# Patient Record
Sex: FEMALE | Race: WHITE | NOT HISPANIC OR LATINO | Employment: PART TIME | URBAN - METROPOLITAN AREA
[De-identification: names, ages, dates, MRNs, and addresses within clinical notes are randomized per-mention and may not be internally consistent; named-entity substitution may affect disease eponyms.]

---

## 2017-01-12 ENCOUNTER — APPOINTMENT (OUTPATIENT)
Dept: PHYSICAL THERAPY | Facility: REHABILITATION | Age: 52
End: 2017-01-12
Payer: COMMERCIAL

## 2017-01-17 ENCOUNTER — ALLSCRIPTS OFFICE VISIT (OUTPATIENT)
Dept: OTHER | Facility: OTHER | Age: 52
End: 2017-01-17

## 2017-01-23 ENCOUNTER — APPOINTMENT (OUTPATIENT)
Dept: PHYSICAL THERAPY | Facility: REHABILITATION | Age: 52
End: 2017-01-23
Payer: COMMERCIAL

## 2017-01-23 PROCEDURE — 97164 PT RE-EVAL EST PLAN CARE: CPT

## 2017-01-23 PROCEDURE — 97110 THERAPEUTIC EXERCISES: CPT

## 2017-01-24 ENCOUNTER — GENERIC CONVERSION - ENCOUNTER (OUTPATIENT)
Dept: OTHER | Facility: OTHER | Age: 52
End: 2017-01-24

## 2017-03-30 ENCOUNTER — GENERIC CONVERSION - ENCOUNTER (OUTPATIENT)
Dept: OTHER | Facility: OTHER | Age: 52
End: 2017-03-30

## 2017-09-01 ENCOUNTER — ALLSCRIPTS OFFICE VISIT (OUTPATIENT)
Dept: OTHER | Facility: OTHER | Age: 52
End: 2017-09-01

## 2017-09-01 DIAGNOSIS — E04.1 NONTOXIC SINGLE THYROID NODULE: ICD-10-CM

## 2017-09-06 ENCOUNTER — LAB CONVERSION - ENCOUNTER (OUTPATIENT)
Dept: OTHER | Facility: OTHER | Age: 52
End: 2017-09-06

## 2017-09-06 ENCOUNTER — GENERIC CONVERSION - ENCOUNTER (OUTPATIENT)
Dept: OTHER | Facility: OTHER | Age: 52
End: 2017-09-06

## 2017-09-06 LAB — TSH SERPL DL<=0.05 MIU/L-ACNC: 3.5 MIU/L

## 2017-09-07 DIAGNOSIS — Z12.31 ENCOUNTER FOR SCREENING MAMMOGRAM FOR MALIGNANT NEOPLASM OF BREAST: ICD-10-CM

## 2017-09-08 ENCOUNTER — HOSPITAL ENCOUNTER (OUTPATIENT)
Dept: RADIOLOGY | Facility: HOSPITAL | Age: 52
Discharge: HOME | End: 2017-09-08
Payer: COMMERCIAL

## 2017-09-08 DIAGNOSIS — E04.1 NONTOXIC SINGLE THYROID NODULE: ICD-10-CM

## 2017-09-08 PROCEDURE — 76536 US EXAM OF HEAD AND NECK: CPT

## 2017-09-11 ENCOUNTER — GENERIC CONVERSION - ENCOUNTER (OUTPATIENT)
Dept: OTHER | Facility: OTHER | Age: 52
End: 2017-09-11

## 2017-11-06 ENCOUNTER — APPOINTMENT (OUTPATIENT)
Dept: PHYSICAL THERAPY | Facility: REHABILITATION | Age: 52
End: 2017-11-06
Payer: COMMERCIAL

## 2017-11-06 PROCEDURE — 97140 MANUAL THERAPY 1/> REGIONS: CPT

## 2017-11-06 PROCEDURE — 97161 PT EVAL LOW COMPLEX 20 MIN: CPT

## 2017-11-06 PROCEDURE — G8978 MOBILITY CURRENT STATUS: HCPCS

## 2017-11-06 PROCEDURE — G8979 MOBILITY GOAL STATUS: HCPCS

## 2017-11-10 ENCOUNTER — APPOINTMENT (OUTPATIENT)
Dept: PHYSICAL THERAPY | Facility: REHABILITATION | Age: 52
End: 2017-11-10
Payer: COMMERCIAL

## 2017-11-10 PROCEDURE — 97110 THERAPEUTIC EXERCISES: CPT

## 2017-11-10 PROCEDURE — 97140 MANUAL THERAPY 1/> REGIONS: CPT

## 2017-11-13 ENCOUNTER — APPOINTMENT (OUTPATIENT)
Dept: PHYSICAL THERAPY | Facility: REHABILITATION | Age: 52
End: 2017-11-13
Payer: COMMERCIAL

## 2017-11-13 PROCEDURE — 97110 THERAPEUTIC EXERCISES: CPT

## 2017-11-13 PROCEDURE — 97140 MANUAL THERAPY 1/> REGIONS: CPT

## 2017-11-16 ENCOUNTER — APPOINTMENT (OUTPATIENT)
Dept: PHYSICAL THERAPY | Facility: REHABILITATION | Age: 52
End: 2017-11-16
Payer: COMMERCIAL

## 2017-11-16 PROCEDURE — 97140 MANUAL THERAPY 1/> REGIONS: CPT

## 2017-11-16 PROCEDURE — 97110 THERAPEUTIC EXERCISES: CPT

## 2017-11-20 ENCOUNTER — APPOINTMENT (OUTPATIENT)
Dept: PHYSICAL THERAPY | Facility: REHABILITATION | Age: 52
End: 2017-11-20
Payer: COMMERCIAL

## 2017-11-20 PROCEDURE — 97140 MANUAL THERAPY 1/> REGIONS: CPT

## 2017-11-20 PROCEDURE — 97110 THERAPEUTIC EXERCISES: CPT

## 2017-11-22 ENCOUNTER — APPOINTMENT (OUTPATIENT)
Dept: PHYSICAL THERAPY | Facility: REHABILITATION | Age: 52
End: 2017-11-22
Payer: COMMERCIAL

## 2017-11-22 PROCEDURE — 97140 MANUAL THERAPY 1/> REGIONS: CPT

## 2017-11-22 PROCEDURE — 97110 THERAPEUTIC EXERCISES: CPT

## 2017-11-28 ENCOUNTER — APPOINTMENT (OUTPATIENT)
Dept: PHYSICAL THERAPY | Facility: REHABILITATION | Age: 52
End: 2017-11-28
Payer: COMMERCIAL

## 2018-01-10 NOTE — RESULT NOTES
Discussion/Summary   THYROID BLOOD TEST IS GOOD   [facility]     Verified Results  (Q) TSH, 3RD GENERATION W/REFLEX TO FT4 [id number] 09:04AM Kathy Book   REPORT COMMENT:  FASTING:YES     Test Name Result Flag Reference   TSH W/REFLEX TO FT4 3 50 mIU/L     Reference Range                         > or = 20 Years  0 40-4 50                              Pregnancy Ranges            First trimester    0 26-2 66            Second trimester   0 55-2 73            Third trimester    0 43-2 91

## 2018-01-12 NOTE — RESULT NOTES
Verified Results  US THYROID [id number] 09:32AM Myles Shoemaker Order Number: [id number]    - Patient Instructions: To schedule this appointment, please contact Central Scheduling at [phone]  Test Name Result Flag Reference   US THYROID (Report)     THYROID ULTRASOUND     INDICATION: Follow-up thyroid nodule  COMPARISON: 4/13/2016     TECHNIQUE:  Ultrasound of the thyroid was performed with a high frequency linear transducer in transverse and sagittal planes including volumetric imaging sweeps as well as traditional still imaging technique  FINDINGS:   Normal homogeneous smooth echotexture  Right gland: 4 6 x 1 6 x 1 2 cm  No dominant nodules  Left gland: 5 1 x 1 8 x 2 1 cm  Allowing for differences in measurement technique, there is no substantial change in the isoechoic nodule in the mid to lower pole, measuring 2 8 x 1 5 x 1 9 cm-previously 2 6 x 2 1 x 1 6 cm  Isthmus: The isthmus is 0 1 cm in AP dimension  IMPRESSION:      Stable left thyroid nodule               Workstation performed: [id number]     Signed by:   Felipe De Los Santos MD   9/11/17

## 2018-01-13 VITALS
WEIGHT: 161.25 LBS | SYSTOLIC BLOOD PRESSURE: 110 MMHG | DIASTOLIC BLOOD PRESSURE: 84 MMHG | HEIGHT: 55 IN | HEART RATE: 60 BPM | BODY MASS INDEX: 37.32 KG/M2 | RESPIRATION RATE: 16 BRPM

## 2018-01-13 NOTE — RESULT NOTES
Verified Results  (Q) COMPREHENSIVE METABOLIC PNL W/ADJUSTED CALCIUM 28Apr2016 09:04AM Lilian Alvarez     Test Name Result Flag Reference   GLUCOSE 65 mg/dL  65-99   Fasting reference interval   UREA NITROGEN (BUN) 17 mg/dL  7-25   CREATININE 0 73 mg/dL  0 50-1 05   For patients >52years of age, the reference limit  for Creatinine is approximately 13% higher for people  identified as -American  eGFR NON-AFR  AMERICAN 96 mL/min/1 73m2  > OR = 60   eGFR AFRICAN AMERICAN 111 mL/min/1 73m2  > OR = 60   BUN/CREATININE RATIO   0-32   NOT APPLICABLE (calc)   SODIUM 137 mmol/L  135-146   POTASSIUM 4 2 mmol/L  3 5-5 3   CHLORIDE 100 mmol/L     CARBON DIOXIDE 24 mmol/L  19-30   CALCIUM 9 2 mg/dL  8 6-10 4   CALCIUM (ADJUSTED FOR$ALBUMIN) 9 3 mg/dL (calc)  8 6-10 2   PROTEIN, TOTAL 6 6 g/dL  6 1-8 1   ALBUMIN 4 2 g/dL  3 6-5 1   GLOBULIN 2 4 g/dL (calc)  1 9-3 7   ALBUMIN/GLOBULIN RATIO 1 8 (calc)  1 0-2 5   BILIRUBIN, TOTAL 1 0 mg/dL  0 2-1 2   ALKALINE PHOSPHATASE 25 U/L L    AST 16 U/L  10-35   ALT 13 U/L  6-29     (Q) LIPID PANEL WITH REFLEX TO DIRECT LDL 28Apr2016 09:04AM Lilian Alvarez     Test Name Result Flag Reference   CHOLESTEROL, TOTAL 185 mg/dL  125-200   HDL CHOLESTEROL 48 mg/dL  > OR = 46   TRIGLICERIDES 192 mg/dL  <150   LDL-CHOLESTEROL 116 mg/dL (calc)  <130   Desirable range <100 mg/dL for patients with CHD or  diabetes and <70 mg/dL for diabetic patients with  known heart disease  CHOL/HDLC RATIO 3 9 (calc)  < OR = 5 0   NON HDL CHOLESTEROL 137 mg/dL (calc)     Target for non-HDL cholesterol is 30 mg/dL higher than   LDL cholesterol target       (Q) TSH, 3RD GENERATION W/REFLEX TO FT4 28Apr2016 09:04AM Lilian Alvarez   REPORT COMMENT:  FASTING:YES     Test Name Result Flag Reference   TSH W/REFLEX TO FT4 2 00 mIU/L     Reference Range                         > or = 20 Years  0 40-4 50                              Pregnancy Ranges            First trimester    0 26-2 66 Second trimester   0 55-2 73            Third trimester    0 43-2 91       Discussion/Summary   VERY GOOD     Annette Garza

## 2018-01-15 VITALS
BODY MASS INDEX: 26.03 KG/M2 | DIASTOLIC BLOOD PRESSURE: 78 MMHG | SYSTOLIC BLOOD PRESSURE: 118 MMHG | WEIGHT: 162 LBS | HEIGHT: 66 IN

## 2018-01-16 NOTE — RESULT NOTES
Verified Results  US THYROID 13Apr2016 09:22AM Lenore Frias Order Number: [id number]     Test Name Result Flag Reference   US THYROID (Report)     THYROID ULTRASOUND     INDICATION: Palpable thyroid mass     COMPARISON: None  TECHNIQUE:  Ultrasound of the thyroid was performed with a high frequency linear transducer in transverse and sagittal planes including volumetric imaging sweeps as well as traditional still imaging technique  FINDINGS:   Normal homogeneous smooth echotexture  Right gland: 4 5 x 1 3 x 1 3 cm  No dominant nodules  Left gland: 4 3 x 1 9 x 1 8 cm  Left mid to lower pole 2 6 x 2 1 x 1 6 cm  Solid isoechoic nodule  Smooth, well defined margins  No calcifications  Nodule is not taller than it is wide  No priors for comparison  Isthmus: 0 2 cm in AP dimension  No dominant nodules  IMPRESSION:      2 6 x 2 1 x 1 6 left mid to lower pole solid isoechoic nodule  This nodule does meet published criteria for recommending ultrasound guided biopsy:      (CRITERIA: Low suspicion sonographic pattern, >/= 1 5 cm )         Reference: 2015 American Thyroid Association Management Guidelines for Adult Patients with Thyroid Nodules and Differentiated Thyroid Cancer  Thyroid, Volume 26, Number 1, 2016            ##sigslh##sigslh       Workstation performed: [id number]     Signed by:   Regina Hollins MD   4/14/16

## 2018-01-16 NOTE — PROGRESS NOTES
Assessment    1  Well adult on routine health check (V70 0) (Z00 00)   2  Lipid screening (V77 91) (Z13 220)   3  Thyroid nodule (241 0) (E04 1)    Plan  Lipid screening, Thyroid nodule    · (Q) COMPREHENSIVE METABOLIC PNL W/ADJUSTED CALCIUM; Status:Active; Requested for:17Zzg3607;    · (Q) LIPID PANEL WITH REFLEX TO DIRECT LDL; Status:Active; Requested  for:24Clp0588;    · (Q) TSH, 3RD GENERATION W/REFLEX TO FT4; Status:Active; Requested  for:90Drs7871;    · US THYROID; Status:Hold For - Scheduling; Requested for:26Svr2786;     Discussion/Summary  health maintenance visit Currently, she eats a healthy diet  cervical cancer screening is current Breast cancer screening: mammogram is current  Colorectal cancer screening: colorectal cancer screening is current  Osteoporosis screening: bone mineral density testing is not indicated  Screening lab work includes glucose, lipid profile and thyroid function testing  The risks and benefits of immunizations were discussed  Chief Complaint  Patient here for Annual Wellness exam      History of Present Illness  HM, Adult Female: The patient is being seen for a health maintenance evaluation  General Health: The patient's health since the last visit is described as good  She has regular dental visits  She denies vision problems  She denies hearing loss  Immunizations status: up to date  Lifestyle:  She exercises regularly  She does not use tobacco  She denies alcohol use  She denies drug use  Screening: cancer screening reviewed and updated  Cervical cancer screening includes a pap smear performed last year  Breast cancer screening includes a mammogram performed last year  Colorectal cancer screening includes a colonoscopy performed within the past ten years  metabolic screening reviewed and updated  Metabolic screening includes lipid profile performed within the past five years, glucose screening performed last year and thyroid function test performed last year  Review of Systems    Constitutional: No fever, no chills, feels well, no tiredness, no recent weight gain or weight loss  Eyes: No complaints of eye pain, no red eyes, no eyesight problems, no discharge, no dry eyes, no itching of eyes  ENT: no complaints of earache, no loss of hearing, no nose bleeds, no nasal discharge, no sore throat, no hoarseness  Cardiovascular: No complaints of slow heart rate, no fast heart rate, no chest pain, no palpitations, no leg claudication, no lower extremity edema  Respiratory: No complaints of shortness of breath, no wheezing, no cough, no SOB on exertion, no orthopnea, no PND  Gastrointestinal: No complaints of abdominal pain, no constipation, no nausea or vomiting, no diarrhea, no bloody stools  Genitourinary: No complaints of dysuria, no incontinence, no pelvic pain, no dysmenorrhea, no vaginal discharge or bleeding  Musculoskeletal: No complaints of arthralgias, no myalgias, no joint swelling or stiffness, no limb pain or swelling  Integumentary: No complaints of skin rash or lesions, no itching, no skin wounds, no breast pain or lump  Neurological: No complaints of headache, no confusion, no convulsions, no numbness, no dizziness or fainting, no tingling, no limb weakness, no difficulty walking  Psychiatric: Not suicidal, no sleep disturbance, no anxiety or depression, no change in personality, no emotional problems  Endocrine: No complaints of proptosis, no hot flashes, no muscle weakness, no deepening of the voice, no feelings of weakness  Hematologic/Lymphatic: No complaints of swollen glands, no swollen glands in the neck, does not bleed easily, does not bruise easily  Active Problems    1  Anxiety (300 00) (F41 9)   2  Basal cell carcinoma of skin (173 91) (C44 91)   3  Colon cancer screening (V76 51) (Z12 11)   4  Contraceptives (V25 02)   5  Encounter for gynecological examination without abnormal finding (V72 31) (Z01 419)   6  Encounter for screening mammogram for malignant neoplasm of breast (V76 12)   (Z12 31)   7  Menorrhagia (626 2) (N92 0)   8  Need for influenza vaccination (V04 81) (Z23)   9  Other specified menopausal and perimenopausal disorders (627 8) (N95 8)   10  Screening for HPV (human papillomavirus) (V73 81) (Z11 51)   11  Visit for routine gyn exam (V72 31) (Z01 419)    Past Medical History    · History of Acute bronchitis, unspecified organism (466 0) (J20 9)   · History of Acute sinusitis with symptoms > 10 days (461 9) (J01 90)   · History of chest pain (V13 89) (X88 641)   · History of headache (V13 89) (Z87 898)   · History of hypokalemia (V12 29) (Z86 39)    Surgical History    · History of Colposcopy   · Contraceptives (V25 02)   · History of Gallbladder Surgery   · History of Oral Surgery Tooth Extraction    Family History    · Family history of hypertension (V17 49) (Z82 49)    · Family history of Healthy adult    · Family history of Adrenal carcinoma    · Family history of Hypertension (V17 49)   · Family history of IBS (irritable bowel syndrome) (564 1) (K58 9)   · Family history of Intestinal cancer (159 0) (C26 0)   · Family history of Reported Family History Of Cancer   · Family history of Varicose Veins Of Lower Extremities    Social History    · Being A Social Drinker   · Currently sexually active   · Daily Coffee Consumption (___ Cups/Day)   · Exercise Frequency (Times/Week)   · Denied: History of Exercises regularly   · Marital History - Currently    ·    · Never A Smoker   · Denied: History of Uses drugs daily    Current Meds   1  No Reported Medications  Requested for: 21PRO8730 Recorded    Allergies    1  Amoxicillin CAPS   2  Codeine Sulfate TABS   3  Penicillins    4   Mold    Vitals   Recorded: 15Vth0005 11:14AM   Heart Rate 64   Respiration 18   Systolic 681   Diastolic 80   Height 5 ft 5 63 in   Weight 158 lb    BMI Calculated 25 79   BSA Calculated 1 8     Physical Exam    Constitutional   General appearance: No acute distress, well appearing and well nourished  Head and Face   Head and face: Normal     Eyes   Conjunctiva and lids: No swelling, erythema or discharge  Pupils and irises: Equal, round, reactive to light  Ears, Nose, Mouth, and Throat   External inspection of ears and nose: Normal     Otoscopic examination: Tympanic membranes translucent with normal light reflex  Canals patent without erythema  Hearing: Normal     Nasal mucosa, septum, and turbinates: Normal without edema or erythema  Lips, teeth, and gums: Normal, good dentition  Oropharynx: Normal with no erythema, edema, exudate or lesions  Neck   Neck: Supple, symmetric, trachea midline, no masses  Thyroid: Abnormal   Mulitiple firm nodules were palpated on the left  Pulmonary   Respiratory effort: No increased work of breathing or signs of respiratory distress  Auscultation of lungs: Clear to auscultation  Cardiovascular   Auscultation of heart: Normal rate and rhythm, normal S1 and S2, no murmurs  Examination of extremities for edema and/or varicosities: Normal     Abdomen   Abdomen: Non-tender, no masses  Liver and spleen: No hepatomegaly or splenomegaly  Lymphatic   Palpation of lymph nodes in neck: No lymphadenopathy  Palpation of lymph nodes in axillae: No lymphadenopathy  Musculoskeletal   Gait and station: Normal     Digits and nails: Normal without clubbing or cyanosis  Joints, bones, and muscles: Normal     Range of motion: Normal     Stability: Normal     Muscle strength/tone: Normal     Skin   Skin and subcutaneous tissue: Normal without rashes or lesions  Palpation of skin and subcutaneous tissue: Normal turgor  Neurologic   Cranial nerves: Cranial nerves II-XII intact  Cortical function: Normal mental status  Reflexes: 2+ and symmetric  Sensation: No sensory loss  Coordination: Normal finger to nose and heel to shin  Psychiatric   Judgment and insight: Normal     Orientation to person, place, and time: Normal     Recent and remote memory: Intact  Mood and affect: Normal        Results/Data  PHQ-2 Adult Depression Screening 20Gpk9445 11:18AM User, Ahs     Test Name Result Flag Reference   PHQ-2 Adult Depression Score 0     Q1: 0, Q2: 0   PHQ-2 Adult Depression Screening Negative         Health Management  Colon cancer screening   COLONOSCOPY (GI, SURG); every 10 years; Last 33MWY8825; Next Due: 30Hhz0298; Active    Future Appointments    Date/Time Provider Specialty Site   01/17/2017 10:20 AM Von Lane DO Obstetrics/Gynecology [facility] [address], [address]     Signatures   Electronically signed by : Monika Chau DO;  Apr 11 2016 11:31AM EST                       (Author)

## 2018-02-01 ENCOUNTER — HOSPITAL ENCOUNTER (OUTPATIENT)
Dept: RADIOLOGY | Facility: HOSPITAL | Age: 53
Discharge: HOME | End: 2018-02-01
Attending: OBSTETRICS & GYNECOLOGY
Payer: COMMERCIAL

## 2018-02-01 DIAGNOSIS — Z12.31 ENCOUNTER FOR SCREENING MAMMOGRAM FOR MALIGNANT NEOPLASM OF BREAST: ICD-10-CM

## 2018-02-01 PROCEDURE — 77067 SCR MAMMO BI INCL CAD: CPT

## 2018-02-19 ENCOUNTER — OFFICE VISIT (OUTPATIENT)
Dept: OBGYN CLINIC | Facility: CLINIC | Age: 53
End: 2018-02-19
Payer: COMMERCIAL

## 2018-02-19 VITALS
SYSTOLIC BLOOD PRESSURE: 124 MMHG | HEIGHT: 65 IN | DIASTOLIC BLOOD PRESSURE: 74 MMHG | BODY MASS INDEX: 26.99 KG/M2 | WEIGHT: 162 LBS

## 2018-02-19 DIAGNOSIS — Z12.11 SCREENING FOR COLON CANCER: ICD-10-CM

## 2018-02-19 DIAGNOSIS — Z01.419 ENCOUNTER FOR GYNECOLOGICAL EXAMINATION WITHOUT ABNORMAL FINDING: Primary | ICD-10-CM

## 2018-02-19 DIAGNOSIS — Z11.51 SCREENING FOR HUMAN PAPILLOMAVIRUS (HPV): ICD-10-CM

## 2018-02-19 DIAGNOSIS — Z12.31 ENCOUNTER FOR SCREENING MAMMOGRAM FOR MALIGNANT NEOPLASM OF BREAST: ICD-10-CM

## 2018-02-19 PROCEDURE — S0610 ANNUAL GYNECOLOGICAL EXAMINA: HCPCS | Performed by: OBSTETRICS & GYNECOLOGY

## 2018-02-19 PROCEDURE — G0145 SCR C/V CYTO,THINLAYER,RESCR: HCPCS | Performed by: OBSTETRICS & GYNECOLOGY

## 2018-02-19 PROCEDURE — 87624 HPV HI-RISK TYP POOLED RSLT: CPT | Performed by: OBSTETRICS & GYNECOLOGY

## 2018-02-19 NOTE — PROGRESS NOTES
Assessment/Plan:    No problem-specific Assessment & Plan notes found for this encounter  Diagnoses and all orders for this visit:    Encounter for gynecological examination without abnormal finding    Encounter for screening mammogram for malignant neoplasm of breast  -     Mammo screening bilateral w cad; Future    Screening for human papillomavirus (HPV)  -     Liquid-based pap, screening        Annual examination was completed  Mammogram was ordered  Pap with HPV testing was obtained  We discussed perimenopause and management strategies  Patient will increase her exercise routine and watch her triggers  She will call if she has overly symptomatic otherwise return to the office in 1 year  Subjective:      Patient ID: Dev Carrasquillo is a 46 y o  female  Patient returns for annual gyn visit  She has no new medical surgical issues  She has been having some menopausal symptoms  She has been without menses for 2 months  Gynecologic Exam   The patient's pertinent negatives include no pelvic pain or vaginal discharge  The following portions of the patient's history were reviewed and updated as appropriate: allergies, current medications, past family history, past medical history, past social history, past surgical history and problem list     Review of Systems   Constitutional:        Occ hot flash, palpitations   HENT: Negative  Eyes: Negative  Respiratory: Negative  Cardiovascular: Negative  Gastrointestinal: Negative  Endocrine: Negative  Genitourinary: Negative for menstrual problem (normal flow and duration), pelvic pain, vaginal discharge and vaginal pain  Musculoskeletal: Negative  Skin: Negative  Allergic/Immunologic: Negative  Neurological: Negative  Psychiatric/Behavioral: Negative            Objective:      /74 (BP Location: Left arm, Patient Position: Sitting, Cuff Size: Standard)   Ht 5' 5" (1 651 m)   Wt 73 5 kg (162 lb)   LMP 12/23/2017   BMI 26 96 kg/m²          Physical Exam   Constitutional: She is oriented to person, place, and time  She appears well-developed and well-nourished  HENT:   Head: Normocephalic and atraumatic  Nose: Nose normal    Eyes: EOM are normal  Pupils are equal, round, and reactive to light  Neck: Normal range of motion  Neck supple  No thyromegaly present  Cardiovascular: Normal rate and regular rhythm  Pulmonary/Chest: Effort normal and breath sounds normal  No respiratory distress  Abdominal: Soft  Bowel sounds are normal  She exhibits no distension and no mass  There is no tenderness  Hernia confirmed negative in the right inguinal area and confirmed negative in the left inguinal area  Genitourinary: Vagina normal and uterus normal  No breast swelling, tenderness, discharge or bleeding  Pelvic exam was performed with patient supine  No labial fusion  There is no rash, tenderness, lesion or injury on the right labia  There is no rash, tenderness, lesion or injury on the left labia  Cervix exhibits no motion tenderness, no discharge and no friability  Genitourinary Comments: Ext gen nl w/o lesions  Vag healthy w/o lesions or discharge  Cervix healthy w/o lesions  Uterus nl size, NT  Adnexa NT no masses  Rectal no masses   Musculoskeletal: Normal range of motion  She exhibits no edema  Lymphadenopathy:        Right: No inguinal adenopathy present  Left: No inguinal adenopathy present  Neurological: She is alert and oriented to person, place, and time  She has normal reflexes  Skin: Skin is warm and dry  No rash noted  Psychiatric: She has a normal mood and affect  Her behavior is normal  Thought content normal    Nursing note and vitals reviewed

## 2018-02-22 LAB
HPV RRNA GENITAL QL NAA+PROBE: NORMAL
LAB AP GYN PRIMARY INTERPRETATION: NORMAL
LAB AP LMP: NORMAL
Lab: NORMAL

## 2018-06-27 ENCOUNTER — HOSPITAL ENCOUNTER (EMERGENCY)
Facility: HOSPITAL | Age: 53
Discharge: HOME | End: 2018-06-27
Attending: EMERGENCY MEDICINE | Admitting: EMERGENCY MEDICINE
Payer: COMMERCIAL

## 2018-06-27 VITALS
WEIGHT: 158 LBS | OXYGEN SATURATION: 94 % | RESPIRATION RATE: 18 BRPM | HEIGHT: 65 IN | BODY MASS INDEX: 26.33 KG/M2 | TEMPERATURE: 97.9 F | DIASTOLIC BLOOD PRESSURE: 56 MMHG | HEART RATE: 62 BPM | SYSTOLIC BLOOD PRESSURE: 109 MMHG

## 2018-06-27 DIAGNOSIS — R51.9 HEADACHE: Primary | ICD-10-CM

## 2018-06-27 DIAGNOSIS — B02.9 SHINGLES: ICD-10-CM

## 2018-06-27 LAB
APPEARANCE CSF: CLEAR
GLUCOSE CSF-MCNC: 49 MG/DL (ref 50–80)
GRAM STN SPEC: NORMAL
LYMPHOCYTES NFR CSF MANUAL: 47 %
MONOS+MACROS CSF MANUAL: 53 %
NEUTROPHILS NFR CSF MANUAL: 0 %
PROT CSF-MCNC: 32 MG/DL (ref 15–45)
RBC # CSF MANUAL: 30 UL (ref 0–10)
RBC # CSF MANUAL: 6 UL (ref 0–10)
TOTAL CELLS COUNTED BLD: NO
TOTAL CELLS COUNTED SPEC: 19
TUBE # CSF: 4
WBC # CSF AUTO: 1 /UL (ref 0–5)

## 2018-06-27 PROCEDURE — 96375 TX/PRO/DX INJ NEW DRUG ADDON: CPT

## 2018-06-27 PROCEDURE — 87798 DETECT AGENT NOS DNA AMP: CPT | Performed by: STUDENT IN AN ORGANIZED HEALTH CARE EDUCATION/TRAINING PROGRAM

## 2018-06-27 PROCEDURE — 99283 EMERGENCY DEPT VISIT LOW MDM: CPT

## 2018-06-27 PROCEDURE — 89050 BODY FLUID CELL COUNT: CPT | Performed by: STUDENT IN AN ORGANIZED HEALTH CARE EDUCATION/TRAINING PROGRAM

## 2018-06-27 PROCEDURE — 87070 CULTURE OTHR SPECIMN AEROBIC: CPT | Performed by: EMERGENCY MEDICINE

## 2018-06-27 PROCEDURE — 89051 BODY FLUID CELL COUNT: CPT | Performed by: STUDENT IN AN ORGANIZED HEALTH CARE EDUCATION/TRAINING PROGRAM

## 2018-06-27 PROCEDURE — 87532 HHV-6 DNA AMP PROBE: CPT | Performed by: STUDENT IN AN ORGANIZED HEALTH CARE EDUCATION/TRAINING PROGRAM

## 2018-06-27 PROCEDURE — 87653 STREP B DNA AMP PROBE: CPT | Performed by: STUDENT IN AN ORGANIZED HEALTH CARE EDUCATION/TRAINING PROGRAM

## 2018-06-27 PROCEDURE — 87498 ENTEROVIRUS PROBE&REVRS TRNS: CPT | Performed by: STUDENT IN AN ORGANIZED HEALTH CARE EDUCATION/TRAINING PROGRAM

## 2018-06-27 PROCEDURE — 84157 ASSAY OF PROTEIN OTHER: CPT | Performed by: STUDENT IN AN ORGANIZED HEALTH CARE EDUCATION/TRAINING PROGRAM

## 2018-06-27 PROCEDURE — 87529 HSV DNA AMP PROBE: CPT | Performed by: STUDENT IN AN ORGANIZED HEALTH CARE EDUCATION/TRAINING PROGRAM

## 2018-06-27 PROCEDURE — 87496 CYTOMEG DNA AMP PROBE: CPT | Performed by: STUDENT IN AN ORGANIZED HEALTH CARE EDUCATION/TRAINING PROGRAM

## 2018-06-27 PROCEDURE — 82945 GLUCOSE OTHER FLUID: CPT | Performed by: STUDENT IN AN ORGANIZED HEALTH CARE EDUCATION/TRAINING PROGRAM

## 2018-06-27 PROCEDURE — 96374 THER/PROPH/DIAG INJ IV PUSH: CPT

## 2018-06-27 RX ORDER — GABAPENTIN 300 MG/1
300 CAPSULE ORAL 3 TIMES DAILY
Qty: 30 CAPSULE | Refills: 0 | Status: SHIPPED | OUTPATIENT
Start: 2018-06-27 | End: 2019-02-27 | Stop reason: ALTCHOICE

## 2018-06-27 RX ORDER — METOCLOPRAMIDE HYDROCHLORIDE 5 MG/ML
10 INJECTION INTRAMUSCULAR; INTRAVENOUS ONCE
Status: COMPLETED | OUTPATIENT
Start: 2018-06-27 | End: 2018-06-27

## 2018-06-27 RX ORDER — LIDOCAINE HYDROCHLORIDE AND EPINEPHRINE 10; 10 MG/ML; UG/ML
10 INJECTION, SOLUTION INFILTRATION; PERINEURAL ONCE
Status: COMPLETED | OUTPATIENT
Start: 2018-06-27 | End: 2018-06-27

## 2018-06-27 RX ORDER — FAMCICLOVIR 500 MG/1
500 TABLET, FILM COATED ORAL 3 TIMES DAILY
COMMUNITY
End: 2019-02-27 | Stop reason: ALTCHOICE

## 2018-06-27 RX ORDER — ONDANSETRON 2 MG/ML
4 INJECTION INTRAMUSCULAR; INTRAVENOUS ONCE
Status: COMPLETED | OUTPATIENT
Start: 2018-06-27 | End: 2018-06-27

## 2018-06-27 RX ORDER — ONDANSETRON 2 MG/ML
INJECTION INTRAMUSCULAR; INTRAVENOUS
Status: COMPLETED
Start: 2018-06-27 | End: 2018-06-27

## 2018-06-27 RX ORDER — KETOROLAC TROMETHAMINE 30 MG/ML
15 INJECTION, SOLUTION INTRAMUSCULAR; INTRAVENOUS ONCE
Status: COMPLETED | OUTPATIENT
Start: 2018-06-27 | End: 2018-06-27

## 2018-06-27 RX ADMIN — METOCLOPRAMIDE 10 MG: 5 INJECTION, SOLUTION INTRAMUSCULAR; INTRAVENOUS at 13:07

## 2018-06-27 RX ADMIN — ONDANSETRON 4 MG: 2 INJECTION, SOLUTION INTRAMUSCULAR; INTRAVENOUS at 14:04

## 2018-06-27 RX ADMIN — KETOROLAC TROMETHAMINE 15 MG: 30 INJECTION, SOLUTION INTRAMUSCULAR at 13:06

## 2018-06-27 RX ADMIN — LIDOCAINE HYDROCHLORIDE,EPINEPHRINE BITARTRATE 10 ML: 10; .01 INJECTION, SOLUTION INFILTRATION; PERINEURAL at 13:36

## 2018-06-27 NOTE — ED PROVIDER NOTES
History  Chief Complaint   Patient presents with    Pain     pt diagnosed with shingles on her head, neck and shoulder area  Patients pain level is increasing every day was seen Monday for the diagnosis- PCP called Jenny Allen who suggested pt come to the ED for further eval and possible spinal tap, pt with stiiff neck and jaw and facial pain also        This is a 49-year-old female with a T recent diagnosis of shingles in a R C3-C4 distribution currently taking famciclovir presents to the emergency department this morning with worsening rash, headache, and neck pain  Patient states that she discussed with her dermatologist the symptoms who consulted with Infectious Disease here at [facility] and she was instructed to come to the emergency room for further evaluation and possible lumbar puncture  Patient states that her headache is mainly right-sided but covers her entire head and feels like previous migraines as it is associated with some nausea  Patient's neck pain is in the right paraspinal muscles she denies any midline tenderness  Patient denies any fevers or chills  Prior to Admission Medications   Prescriptions Last Dose Informant Patient Reported?  Taking?   famciclovir (FAMVIR) 500 mg tablet 6/27/2018 at 0800  Yes Yes   Sig: Take 500 mg by mouth 3 (three) times a day      Facility-Administered Medications: None       Past Medical History:   Diagnosis Date    Chest pain     last assessed - 09Apr2015    Headache     Hypokalemia     last assessed - 09Apr2015    Shingles        Past Surgical History:   Procedure Laterality Date    COLPOSCOPY      GALLBLADDER SURGERY      OTHER SURGICAL HISTORY      Contraceptives; last assessed - [id number]    TOOTH EXTRACTION         Family History   Problem Relation Age of Onset    Hypertension Mother     No Known Problems Father     Cancer Brother         Adrenal    Hypertension Family     Irritable bowel syndrome Family     Cancer Family Intestinal Cancer    Varicose Veins Family      I have reviewed and agree with the history as documented  Social History   Substance Use Topics    Smoking status: Former Smoker    Smokeless tobacco: Never Used    Alcohol use Yes      Comment: social        Review of Systems   Constitutional: Negative for chills, fatigue and fever  HENT: Negative for congestion, rhinorrhea, sinus pressure and sore throat  Eyes: Negative for visual disturbance  Respiratory: Negative for cough and shortness of breath  Cardiovascular: Negative for chest pain  Gastrointestinal: Negative for abdominal pain, constipation, diarrhea, nausea and vomiting  Genitourinary: Negative for dysuria, frequency, hematuria and urgency  Musculoskeletal: Negative for arthralgias and myalgias  Skin: Positive for rash  Negative for color change  Neurological: Positive for headaches  Negative for dizziness, light-headedness and numbness  Physical Exam  ED Triage Vitals   Temperature Pulse Respirations Blood Pressure SpO2   06/27/18 1032 06/27/18 1032 06/27/18 1032 06/27/18 1032 06/27/18 1032   97 9 °F (36 6 °C) 69 18 155/90 99 %      Temp Source Heart Rate Source Patient Position - Orthostatic VS BP Location FiO2 (%)   06/27/18 1032 06/27/18 1100 06/27/18 1032 06/27/18 1032 --   Tympanic Monitor Sitting Left arm       Pain Score       06/27/18 1032       8           Orthostatic Vital Signs  Vitals:    06/27/18 1415 06/27/18 1515 06/27/18 1545 06/27/18 1615   BP: 108/54 101/59 104/59 109/56   Pulse: 58 56 58 62   Patient Position - Orthostatic VS: Sitting Sitting Lying Lying       Physical Exam   Constitutional: She is oriented to person, place, and time  She appears well-developed and well-nourished  No distress  HENT:   Head: Normocephalic and atraumatic  Eyes: Conjunctivae are normal  Pupils are equal, round, and reactive to light  Right eye exhibits no discharge  Left eye exhibits no discharge  No scleral icterus  Neck: Normal range of motion  No JVD present  Cardiovascular: Normal rate, regular rhythm and normal heart sounds  Exam reveals no gallop and no friction rub  No murmur heard  Pulmonary/Chest: Effort normal and breath sounds normal  No stridor  No respiratory distress  She has no wheezes  She has no rales  Abdominal: Soft  Bowel sounds are normal  She exhibits no distension  There is no tenderness  There is no guarding  Musculoskeletal: Normal range of motion  She exhibits no edema, tenderness or deformity  Neurological: She is alert and oriented to person, place, and time  No cranial nerve deficit or sensory deficit  She exhibits normal muscle tone  Skin: Skin is warm and dry  No rash noted  She is not diaphoretic  No erythema  No pallor  Psychiatric: She has a normal mood and affect  Her behavior is normal    Nursing note and vitals reviewed  ED Medications  Medications   ketorolac (TORADOL) injection 15 mg (15 mg Intravenous Given 6/27/18 1306)   metoclopramide (REGLAN) injection 10 mg (10 mg Intravenous Given 6/27/18 1307)   lidocaine-epinephrine (XYLOCAINE/EPINEPHRINE) 1 %-1:100,000 injection 10 mL (10 mL Infiltration Given by Other 6/27/18 1336)   ondansetron (ZOFRAN) 4 mg/2 mL injection **AcuDose Override Pull** (4 mg  Given 6/27/18 1404)   ondansetron (ZOFRAN) injection 4 mg (4 mg Intravenous Given 6/27/18 1404)   ondansetron (ZOFRAN) injection 4 mg (4 mg Intravenous Given 6/27/18 1404)       Diagnostic Studies  Results Reviewed     Procedure Component Value Units Date/Time    CSF culture and Gram stain [[id number]] Collected:  06/27/18 1627    Lab Status:   In process Specimen:  Cerebrospinal Fluid Updated:  06/27/18 1627    STAT Gram Stain [[id number]] Collected:  06/27/18 1507    Lab Status:  Final result Specimen:  Other from Lumbar Puncture Updated:  06/27/18 1626     Gram Stain Result No Polys or Bacteria seen    CSF Diff [[id number]] Collected:  06/27/18 1507    Lab Status:  Final result Specimen:  Cerebrospinal Fluid from Lumbar Puncture Updated:  06/27/18 1623     Total Counted 19     Neutrophils % (CSF) 0 %      Lymphs % CSF 47 %      Monocytes % (CSF) 53 %     CSF white cell count with differential [[id number]] Collected:  06/27/18 1507    Lab Status:  Final result Specimen:  Cerebrospinal Fluid from Lumbar Puncture Updated:  06/27/18 1618     Appearance, CSF CLEAR     Tube Number, CSF 4     WBC, CSF 1 /uL      Xanthochromia No    CSF, RBC count (Tube 4) [[id number]]  (Normal) Collected:  06/27/18 1507    Lab Status:  Final result Specimen:  Cerebrospinal Fluid from Lumbar Puncture Updated:  06/27/18 1615     RBC, CSF 6 uL     CSF, RBC count (Tube 1) [[id number]]  (Abnormal) Collected:  06/27/18 1507    Lab Status:  Final result Specimen:  Cerebrospinal Fluid from Lumbar Puncture Updated:  06/27/18 1600     RBC, CSF 30 (H) uL     CSF Total Protein [[id number]]  (Normal) Collected:  06/27/18 1507    Lab Status:  Final result Specimen:  Cerebrospinal Fluid from Lumbar Puncture Updated:  06/27/18 1539     Protein, CSF 32 mg/dL     CSF Glucose [[id number]]  (Abnormal) Collected:  06/27/18 1507    Lab Status:  Final result Specimen:  Cerebrospinal Fluid from Lumbar Puncture Updated:  06/27/18 1539     Glucose, CSF 49 (L) mg/dL     CSF, Comprehensive PCR [[id number]] Collected:  06/27/18 1507    Lab Status:   In process Specimen:  Cerebrospinal Fluid from Lumbar Puncture Updated:  06/27/18 1523                 No orders to display         Procedures  Lumbar Puncture  Date/Time: 6/27/2018 2:04 PM  Performed by: Manuel Dyson  Authorized by: Manuel Dyson     Patient location:  ED  Other Assisting Provider: Yes (comment) (Dr Vasquez Doctor, Dr Daniela Marin)    Consent:     Consent obtained:  Verbal and written    Consent given by:  Patient    Risks discussed:  Bleeding, infection, pain, nerve damage and headache    Alternatives discussed:  No treatment  Universal protocol:     Procedure explained and questions answered to patient or proxy's satisfaction: yes      Relevant documents present and verified: yes      Test results available and properly labeled: yes      Radiology Images displayed and confirmed  If images not available, report reviewed: yes      Required blood products, implants, devices, and special equipment available: yes      Immediately prior to procedure a time out was called: yes      Site/side marked: yes      Patient identity confirmed:  Verbally with patient and arm band  Pre-procedure details:     Preparation: Patient was prepped and draped in usual sterile fashion    Indications:     Indications: evaluation for infection    Anesthesia (see MAR for exact dosages): Anesthesia method:  Local infiltration    Local anesthetic:  Lidocaine 1% WITH epi  Procedure details:     Lumbar space:  L3-L4 interspace    Patient position:  Sitting    Equipment: Lumbar puncture kit used      Needle gauge:  22G x 3 5in    Needle type:  Spinal needle - Quincke tip    Ultrasound guidance: no      Number of attempts:  3    Fluid appearance:  Clear    Tubes of fluid:  4    Total volume (ml):  4  Post-procedure:     Puncture site:  Adhesive bandage applied    Patient tolerance of procedure: Tolerated well, no immediate complications    Patient instructed to lie flat for one(1) hour post lumbar puncture : No              Phone Consults  ED Phone Contact    ED Course                               MDM  Number of Diagnoses or Management Options  Headache:   Shingles:   Diagnosis management comments:   Patient's initial CSF studies were unremarkable  She was discharged home in good condition with a prescription for gabapentin and instructions on how to titrate both on and off the medication  Patient was instructed to follow up with her primary care provider and dermatologist and continue taking her famciclovir  She was instructed to return to the ER should she develop any new or concerning symptoms      CritCare Time    Disposition  Final diagnoses:   Headache   Shingles     Time reflects when diagnosis was documented in both MDM as applicable and the Disposition within this note     Time User Action Codes Description Comment    6/27/2018  4:27 PM Reese Perches Add [R51] Headache     6/27/2018  4:27 PM Reese Perches Add [B02 9] Shingles       ED Disposition     ED Disposition Condition Comment    Discharge  Sondra Garcia discharge to home/self care  Condition at discharge: Good        Follow-up Information     Follow up With Specialties Details Why [address], DO Family Medicine   [address]  [address]  [phone]            Discharge Medication List as of 6/27/2018  4:28 PM      CONTINUE these medications which have NOT CHANGED    Details   famciclovir (FAMVIR) 500 mg tablet Take 500 mg by mouth 3 (three) times a day, Historical Med           No discharge procedures on file  ED Provider  Attending physically available and evaluated Sondra Garcia I managed the patient along with the ED Attending      Electronically Signed by         Cristine Holcomb MD  06/27/18 [id number]

## 2018-06-27 NOTE — ED NOTES
MDs at bedside to perform spinal tap  Consent obtained by MD  Copy on chart         Soha Ortega RN  06/27/18 [address], RN  06/27/18 [id number]

## 2018-06-27 NOTE — DISCHARGE INSTRUCTIONS
Acute Headache, Ambulatory Care   GENERAL INFORMATION:   An acute headache  is pain or discomfort that starts suddenly and gets worse quickly  The cause of an acute headache may not be known  It may be triggered by stress, fatigue, hormones, food, or trauma  Common related symptoms include the following:   · Fever    · Sinus pressure    · Loss of memory    · Nausea or vomiting    · Problems with your vision, such as watery or red eyes, loss of vision, or pain in bright light    · Stiff neck    · Tenderness of the head and neck area    · Trouble staying awake, or being less alert than usual     · Weakness or less energy  Seek immediate care for the following symptoms:   · Severe pain    · A headache that occurs after a blow to the head, a fall, or other trauma     · Confusion or forgetfulness    · Numbness on one side of your face or body  Treatment for an acute headache  may include medicine to decrease pain  You may also need biofeedback or cognitive behavioral therapy  Ask your healthcare provider about these and other treatments for an acute headache  Manage my symptoms:   · Apply heat  on your head for 20 to 30 minutes every 2 hours for as many days as directed  Heat helps decrease pain and muscle spasms  You may alternate heat and ice  · Apply ice  on your head for 15 to 20 minutes every hour or as directed  Use an ice pack, or put crushed ice in a plastic bag  Cover it with a towel  Ice helps decrease pain  · Relax your muscles  Lie down in a comfortable position and close your eyes  Relax your muscles slowly  Start at your toes and work your way up your body  · Keep a record of your headaches  Write down when your headaches start and stop  Include your symptoms and what you were doing when the headache began  Record what you ate or drank for 24 hours before the headache started  Describe the pain and where it hurts  Keep track of what you did to treat your headache and whether it worked    Follow up with your healthcare provider as directed:  Bring your headache record with you when you see your healthcare provider  Write down your questions so you remember to ask them during your visits  CARE AGREEMENT:   You have the right to help plan your care  Learn about your health condition and how it may be treated  Discuss treatment options with your caregivers to decide what care you want to receive  You always have the right to refuse treatment  The above information is an  only  It is not intended as medical advice for individual conditions or treatments  Talk to your doctor, nurse or pharmacist before following any medical regimen to see if it is safe and effective for you  © 2014 [address] is for End User's use only and may not be sold, redistributed or otherwise used for commercial purposes  All illustrations and images included in CareNotes® are the copyrighted property of A D [employer]  or [employer]

## 2018-06-27 NOTE — ED ATTENDING ATTESTATION
Mal Mcnair DO, saw and evaluated the patient  I have discussed the patient with the resident/non-physician practitioner and agree with the resident's/non-physician practitioner's findings, Plan of Care, and MDM as documented in the resident's/non-physician practitioner's note, except where noted  All available labs and Radiology studies were reviewed  At this point I agree with the current assessment done in the Emergency Department  I have conducted an independent evaluation of this patient a history and physical is as follows:    46 yof with shoulder, neck, head pain with active shingles diagnosis  She was referred for evaluation by her dermatologist to consulted with Infectious Disease doctor by phone  Past Medical History:   Diagnosis Date    Chest pain     last assessed - 09Apr2015    Headache     Hypokalemia     last assessed - 09Apr2015    Shingles      Past Surgical History:   Procedure Laterality Date    COLPOSCOPY      GALLBLADDER SURGERY      OTHER SURGICAL HISTORY      Contraceptives; last assessed - [id number]    TOOTH EXTRACTION       /79 (BP Location: Left arm)   Pulse 68   Temp 97 9 °F (36 6 °C) (Tympanic)   Resp 18   Ht 5' 5" (1 651 m)   Wt 71 7 kg (158 lb)   SpO2 97%   BMI 26 29 kg/m²   Patient is in no acute distress  There are no neuro deficits  Pupils are equal round react to light accommodation  Extraocular muscles are intact  Heart is regular in rhythm  Clear to auscultation  Abdomen soft nontender  Motion of her head induces pain and there is neck tenderness but no nuchal rigidity  Shingles rash is present on left shoulder and left paraspinal cervical area and base of skull  Dermatologist and back shows Disease doctor were concern for viral meningitis and referred her here for lumbar puncture  I agreed that the lumbar puncture is a reasonable procedure to doing will perform it  Patient consented        Diagnosis  Zoster      Critical Care Time  CritCare Time    Procedures

## 2018-06-28 LAB
C GATTII+NEOFOR DNA CSF QL NAA+NON-PROBE: NOT DETECTED
CMV DNA CSF QL NAA+NON-PROBE: NOT DETECTED
E COLI K1 DNA CSF QL NAA+NON-PROBE: NOT DETECTED
EV RNA CSF QL NAA+NON-PROBE: NOT DETECTED
GP B STREP DNA CSF QL NAA+NON-PROBE: NOT DETECTED
HAEM INFLU DNA CSF QL NAA+NON-PROBE: NOT DETECTED
HHV6 DNA CSF QL NAA+NON-PROBE: NOT DETECTED
HSV1 DNA CSF QL NAA+NON-PROBE: NOT DETECTED
HSV2 DNA CSF QL NAA+NON-PROBE: NOT DETECTED
L MONOCYTOG DNA CSF QL NAA+NON-PROBE: NOT DETECTED
N MEN DNA CSF QL NAA+NON-PROBE: NOT DETECTED
PARECHOVIRUS A RNA CSF QL NAA+NON-PROBE: NOT DETECTED
S PNEUM DNA CSF QL NAA+NON-PROBE: NOT DETECTED
VZV DNA CSF QL NAA+NON-PROBE: NOT DETECTED

## 2018-06-30 LAB — BACTERIA CSF CULT: NO GROWTH

## 2018-10-11 ENCOUNTER — LAB REQUISITION (OUTPATIENT)
Dept: LAB | Facility: HOSPITAL | Age: 53
End: 2018-10-11
Payer: COMMERCIAL

## 2018-10-11 DIAGNOSIS — B95.8 UNSPECIFIED STAPHYLOCOCCUS AS THE CAUSE OF DISEASES CLASSIFIED ELSEWHERE: ICD-10-CM

## 2018-10-11 PROCEDURE — 87070 CULTURE OTHR SPECIMN AEROBIC: CPT | Performed by: DERMATOLOGY

## 2018-10-11 PROCEDURE — 87205 SMEAR GRAM STAIN: CPT | Performed by: DERMATOLOGY

## 2018-10-13 LAB
BACTERIA WND AEROBE CULT: NORMAL
GRAM STN SPEC: NORMAL

## 2019-02-27 ENCOUNTER — ANNUAL EXAM (OUTPATIENT)
Dept: OBGYN CLINIC | Facility: CLINIC | Age: 54
End: 2019-02-27

## 2019-02-27 VITALS
HEIGHT: 65 IN | BODY MASS INDEX: 26.36 KG/M2 | DIASTOLIC BLOOD PRESSURE: 70 MMHG | WEIGHT: 158.2 LBS | SYSTOLIC BLOOD PRESSURE: 112 MMHG

## 2019-02-27 DIAGNOSIS — Z12.39 SCREENING FOR MALIGNANT NEOPLASM OF BREAST: ICD-10-CM

## 2019-02-27 DIAGNOSIS — Z01.419 ENCOUNTER FOR GYNECOLOGICAL EXAMINATION (GENERAL) (ROUTINE) WITHOUT ABNORMAL FINDINGS: Primary | ICD-10-CM

## 2019-02-27 NOTE — PROGRESS NOTES
Assessment/Plan:    No problem-specific Assessment & Plan notes found for this encounter  Diagnoses and all orders for this visit:    Encounter for gynecological examination (general) (routine) without abnormal findings    Screening for malignant neoplasm of breast  -     Mammo screening bilateral w cad; Future        Annual examination was completed  Mammogram was ordered  We did review the constellation of issues that go along with perimenopause  Patient will call me should she have any challenges relative to this  Patient otherwise return to the office in 1 year  Subjective:      Patient ID: Jona Claude is a 48 y o  female  Patient returns for annual gyn visit  She has no new medical surgical issues  She did have an episode of shingles in July and recovered well from this  The patient had her last menses in January this was 7 months from previous  Patient has done quite well with managing her vernon menopausal issues and her symptomatology  Her anxieties are much improved  She does continue to deal with some of the stressors with her daughter who has attention deficit disorder  Gynecologic Exam         The following portions of the patient's history were reviewed and updated as appropriate:   She  has a past medical history of Chest pain, Headache, Hypokalemia, and Shingles  She   Patient Active Problem List    Diagnosis Date Noted    Encounter for gynecological examination (general) (routine) without abnormal findings 02/27/2019    Screening for malignant neoplasm of breast 02/27/2019    Encounter for screening mammogram for malignant neoplasm of breast 02/19/2018    Screening for human papillomavirus (HPV) 02/19/2018    Encounter for gynecological examination without abnormal finding 02/19/2018     She  has a past surgical history that includes Colposcopy; Other surgical history; Gallbladder surgery; and Tooth extraction    Her family history includes Cancer in her brother and family; Hypertension in her family and mother; Irritable bowel syndrome in her family; No Known Problems in her father; Varicose Veins in her family  She  reports that she has quit smoking  She has never used smokeless tobacco  She reports that she drinks alcohol  She reports that she does not use drugs  No current outpatient medications on file  No current facility-administered medications for this visit  Current Outpatient Medications on File Prior to Visit   Medication Sig    [DISCONTINUED] famciclovir (FAMVIR) 500 mg tablet Take 500 mg by mouth 3 (three) times a day    [DISCONTINUED] gabapentin (NEURONTIN) 300 mg capsule Take 1 capsule (300 mg total) by mouth 3 (three) times a day For post-herpetic neuralgia: Take 1 tablet on day 1,  Then take 2 tablets on day 2, Then take 3 tablets on day 3 and every day after that as instructed by your doctor  No current facility-administered medications on file prior to visit  She is allergic to amoxicillin; codeine; and molds & smuts       Review of Systems   All other systems reviewed and are negative  Objective:      /70 (BP Location: Left arm, Patient Position: Sitting, Cuff Size: Standard)   Ht 5' 5" (1 651 m)   Wt 71 8 kg (158 lb 3 2 oz)   LMP 02/05/2019   BMI 26 33 kg/m²          Physical Exam   Constitutional: She is oriented to person, place, and time  She appears well-developed and well-nourished  HENT:   Head: Normocephalic and atraumatic  Nose: Nose normal    Eyes: Pupils are equal, round, and reactive to light  EOM are normal    Neck: Normal range of motion  Neck supple  No thyromegaly present  Cardiovascular: Normal rate and regular rhythm  Pulmonary/Chest: Effort normal and breath sounds normal  No respiratory distress  No breast tenderness, discharge or bleeding  Breasts symmetrical without masses, skin changes or adenopathy   Abdominal: Soft  Bowel sounds are normal  She exhibits no distension and no mass   There is no tenderness  Hernia confirmed negative in the right inguinal area and confirmed negative in the left inguinal area  Genitourinary: Vagina normal and uterus normal  No breast tenderness, discharge or bleeding  Pelvic exam was performed with patient supine  No labial fusion  There is no rash, tenderness, lesion or injury on the right labia  There is no rash, tenderness, lesion or injury on the left labia  Cervix exhibits no motion tenderness, no discharge and no friability  Genitourinary Comments: Ext gen nl w/o lesions  Vag healthy w/o lesions or discharge  Cervix healthy w/o lesions  Uterus nl size, NT  Adnexa NT no masses  Rectal no masses   Musculoskeletal: Normal range of motion  She exhibits no edema  Lymphadenopathy:        Right: No inguinal adenopathy present  Left: No inguinal adenopathy present  Neurological: She is alert and oriented to person, place, and time  She has normal reflexes  Skin: Skin is warm and dry  No rash noted  Psychiatric: She has a normal mood and affect  Her behavior is normal  Thought content normal    Nursing note and vitals reviewed

## 2019-05-22 ENCOUNTER — TRANSCRIBE ORDERS (OUTPATIENT)
Dept: ADMINISTRATIVE | Facility: HOSPITAL | Age: 54
End: 2019-05-22

## 2019-05-22 DIAGNOSIS — Z12.31 VISIT FOR SCREENING MAMMOGRAM: Primary | ICD-10-CM

## 2019-05-28 ENCOUNTER — HOSPITAL ENCOUNTER (OUTPATIENT)
Dept: MAMMOGRAPHY | Facility: HOSPITAL | Age: 54
Discharge: HOME | End: 2019-05-28
Attending: OBSTETRICS & GYNECOLOGY
Payer: COMMERCIAL

## 2019-05-28 VITALS — BODY MASS INDEX: 26.33 KG/M2 | WEIGHT: 158 LBS | HEIGHT: 65 IN

## 2019-05-28 DIAGNOSIS — Z12.31 VISIT FOR SCREENING MAMMOGRAM: ICD-10-CM

## 2019-05-28 PROCEDURE — 77067 SCR MAMMO BI INCL CAD: CPT

## 2019-09-23 ENCOUNTER — OFFICE VISIT (OUTPATIENT)
Dept: FAMILY MEDICINE CLINIC | Facility: CLINIC | Age: 54
End: 2019-09-23
Payer: COMMERCIAL

## 2019-09-23 VITALS
HEIGHT: 66 IN | TEMPERATURE: 98.6 F | SYSTOLIC BLOOD PRESSURE: 126 MMHG | HEART RATE: 60 BPM | RESPIRATION RATE: 12 BRPM | DIASTOLIC BLOOD PRESSURE: 80 MMHG | WEIGHT: 163.6 LBS | BODY MASS INDEX: 26.29 KG/M2 | OXYGEN SATURATION: 99 %

## 2019-09-23 DIAGNOSIS — E04.1 THYROID NODULE: Primary | ICD-10-CM

## 2019-09-23 DIAGNOSIS — Z11.59 ENCOUNTER FOR HEPATITIS C SCREENING TEST FOR LOW RISK PATIENT: ICD-10-CM

## 2019-09-23 DIAGNOSIS — Z23 NEED FOR VACCINATION: ICD-10-CM

## 2019-09-23 PROBLEM — F41.1 GENERALIZED ANXIETY DISORDER: Status: ACTIVE | Noted: 2019-09-23

## 2019-09-23 PROCEDURE — 99213 OFFICE O/P EST LOW 20 MIN: CPT | Performed by: FAMILY MEDICINE

## 2019-09-23 PROCEDURE — 90715 TDAP VACCINE 7 YRS/> IM: CPT

## 2019-09-23 PROCEDURE — 90472 IMMUNIZATION ADMIN EACH ADD: CPT

## 2019-09-23 PROCEDURE — 3008F BODY MASS INDEX DOCD: CPT | Performed by: FAMILY MEDICINE

## 2019-09-23 PROCEDURE — 90682 RIV4 VACC RECOMBINANT DNA IM: CPT

## 2019-09-23 PROCEDURE — 90471 IMMUNIZATION ADMIN: CPT

## 2019-09-23 NOTE — PROGRESS NOTES
Assessment/Plan:    1  Thyroid nodule  -     TSH, 3rd generation with Free T4 reflex; Future; Expected date: 09/23/2019  -     US thyroid; Future; Expected date: 09/23/2019  -     TSH, 3rd generation with Free T4 reflex    2  Need for vaccination  -     influenza vaccine, 3410-0023, quadrivalent, recombinant, PF, 0 5 mL, for patients 18 yr+ (FLUBLOK)  -     TDAP VACCINE GREATER THAN OR EQUAL TO 8YO IM    3  Encounter for hepatitis C screening test for low risk patient  -     Hepatitis C antibody; Future; Expected date: 09/23/2019          BMI Counseling: Body mass index is 26 67 kg/m²  Discussed the patient's BMI with her  The BMI is above normal  Nutrition recommendations include reducing portion sizes and 3-5 servings of fruits/vegetables daily  Exercise recommendations include exercising 3-5 times per week  There are no Patient Instructions on file for this visit  No follow-ups on file  Subjective:      Patient ID: Tanya Andrade is a 47 y o  female  Chief Complaint   Patient presents with   [facility]     Patient here with lump in throat feels it's hard to swallow pressure in throat       Here for follow up  Went back into therapy for anxiety  Gained weight from stress eating- this was during summer  In June viral illness- was in [place], 6 weeks treated for sinus infection  Dog ate her bite guard  Mom noticed her thyroid lump  Sensation of lump in throat and pressure related      Sore Throat    This is a new problem  The current episode started more than 1 month ago  The problem has been unchanged  The pain is worse on the left side  There has been no fever  Associated symptoms include trouble swallowing (sensation of something stuck)  She has tried nothing for the symptoms         The following portions of the patient's history were reviewed and updated as appropriate: allergies, current medications, past family history, past medical history, past social history, past surgical history and problem list     Review of Systems   Constitutional: Negative  HENT: Positive for sore throat and trouble swallowing (sensation of something stuck)  Eyes: Negative  Respiratory: Positive for choking  Cardiovascular: Negative  Gastrointestinal: Negative  Endocrine: Negative  Genitourinary: Negative  Musculoskeletal: Negative  Allergic/Immunologic: Negative  Neurological: Negative  Hematological: Negative  Psychiatric/Behavioral: Negative  No current outpatient medications on file  No current facility-administered medications for this visit  Objective:    /80   Pulse 60   Temp 98 6 °F (37 °C)   Resp 12   Ht 5' 5 67" (1 668 m)   Wt 74 2 kg (163 lb 9 6 oz)   SpO2 99%   BMI 26 67 kg/m²        Physical Exam   Constitutional: She appears well-developed and well-nourished  HENT:   Head: Normocephalic and atraumatic  Eyes: Pupils are equal, round, and reactive to light  EOM are normal    Neck: Thyromegaly (left sided nodule) present  Cardiovascular: Normal rate, regular rhythm, normal heart sounds and intact distal pulses  Pulmonary/Chest: Effort normal and breath sounds normal    Abdominal: Soft  Bowel sounds are normal    Musculoskeletal: Normal range of motion  Neurological: She is alert  Skin: Skin is warm  Capillary refill takes less than 2 seconds  Psychiatric: She has a normal mood and affect  Her behavior is normal  Judgment and thought content normal    Nursing note and vitals reviewed               Vivienne Crow DO

## 2019-09-24 LAB
HCV AB S/CO SERPL IA: 0.04
HCV AB SERPL QL IA: NORMAL
TSH SERPL-ACNC: 1.78 MIU/L

## 2019-09-26 ENCOUNTER — HOSPITAL ENCOUNTER (OUTPATIENT)
Dept: ULTRASOUND IMAGING | Facility: HOSPITAL | Age: 54
Discharge: HOME | End: 2019-09-26
Payer: COMMERCIAL

## 2019-09-26 DIAGNOSIS — E04.1 THYROID NODULE: ICD-10-CM

## 2019-09-26 PROCEDURE — 76536 US EXAM OF HEAD AND NECK: CPT

## 2019-09-30 DIAGNOSIS — E04.1 THYROID NODULE: Primary | ICD-10-CM

## 2019-10-30 ENCOUNTER — HOSPITAL ENCOUNTER (OUTPATIENT)
Dept: RADIOLOGY | Facility: HOSPITAL | Age: 54
Discharge: HOME | End: 2019-10-30
Payer: COMMERCIAL

## 2019-10-30 DIAGNOSIS — E04.1 THYROID NODULE: ICD-10-CM

## 2019-10-30 PROCEDURE — 10005 FNA BX W/US GDN 1ST LES: CPT

## 2019-10-30 PROCEDURE — 88173 CYTOPATH EVAL FNA REPORT: CPT | Performed by: PATHOLOGY

## 2019-10-30 RX ORDER — LIDOCAINE HYDROCHLORIDE 10 MG/ML
2 INJECTION, SOLUTION INFILTRATION; PERINEURAL ONCE
Status: COMPLETED | OUTPATIENT
Start: 2019-10-30 | End: 2019-10-30

## 2019-10-30 RX ADMIN — LIDOCAINE HYDROCHLORIDE 2 ML: 10 INJECTION, SOLUTION INFILTRATION; PERINEURAL at 09:45

## 2019-11-14 NOTE — H&P (VIEW-ONLY)
Assessment/Plan:    Thyroid nodule  Most recent thyroid ultrasound from 09/2019 demonstrates:  Right lobe:  4 8 x 1 2 x 1 1 cm  Left lobe:  5 3 x 2 1 x 2 3 cm  Isthmus:  0 2 cm      Nodule #1  Image 19  Left lower to interpolar nodule measuring 3 1 x 2 2 x 2 previously 2 8 x 1 5 x 1 9 cm cm  Given differences in measuring technique, no significant change from prior  COMPOSITION:  2 points, solid or almost completely solid   ECHOGENICITY:  1 point, hyperechoic or isoechoic  SHAPE:  0 points, wider-than-tall  MARGIN: 0 points, smooth  ECHOGENIC FOCI:  0 points, none or large comet-tail artifacts  TI-RADS Classification: TR 3 (3 points), Mildly suspicious  FNA if >2 5 cm  Follow if >1 5 cm  FNAB completed 10/30/2019, [place] Category III, no afirma  09/23/2019:  TSH:  1 78    Reviewed options including observation with follow up ultrasound in one year verus surgery to include thyroid lobectomy  After discussion, patient chose to proceed with thyroid lobectomy  We discussed the nature of thyroid lobectomy at length, including expected vernon- and post-operative courses  We reviewed and discussed risks of surgery, including bleeding, infection, risks of anesthesia, scar, lack of treatment success, recurrence, need for additional treatment, and other  We discussed thyroid specific surgical risks, including need for completion thyroidectomy, hypothyroidism, recurrent laryngeal nerve injury, hoarseness, dysphagia, altered energy level, hypoparathyroidism and hypocalcemia, and other  I answered the patient's questions, and we agree to proceed  Surgery will be scheduled for the near future, and informed consent was obtained  Diagnoses and all orders for this visit:    Thyroid nodule          Subjective:      Patient ID: Jayy Oropeza is a 47 y o  female  Ms Jamir Gomez is a patient new to our office presenting for evaluation of thyroid nodules   She underwent thyroid ultrasound 10/30/2019 as well as subsequent FNAB left thyroid nodule and is here to review  At this time, she complains of noticeable mass in neck as well as dysphagia  The following portions of the patient's history were reviewed and updated as appropriate: allergies, current medications, past family history, past medical history, past social history, past surgical history and problem list     Review of Systems   Constitutional: Negative  HENT: Negative  Negative for voice change  Neck mass  dysphagia   Respiratory: Negative  Negative for shortness of breath  Endocrine: Negative  Allergic/Immunologic: Negative  Neurological: Negative  Psychiatric/Behavioral: Negative  Objective:      /100 (BP Location: Left arm, Patient Position: Sitting, Cuff Size: Standard)   Ht 5' 5 67" (1 668 m)   Wt 73 9 kg (163 lb)   BMI 26 57 kg/m²          Physical Exam   Constitutional: She is oriented to person, place, and time  She appears well-developed and well-nourished  HENT:   Head: Normocephalic and atraumatic  Right Ear: Tympanic membrane, external ear and ear canal normal    Left Ear: Tympanic membrane, external ear and ear canal normal    Nose: Nose normal    Mouth/Throat: Uvula is midline, oropharynx is clear and moist and mucous membranes are normal    Eyes: Pupils are equal, round, and reactive to light  Conjunctivae are normal    Neck: Trachea normal and normal range of motion  Neck supple  Pulmonary/Chest: Effort normal    Neurological: She is alert and oriented to person, place, and time  Skin: Skin is warm, dry and intact  Psychiatric: She has a normal mood and affect  Her speech is normal and behavior is normal    Vitals reviewed

## 2019-11-18 RX ORDER — IBUPROFEN 600 MG/1
TABLET ORAL EVERY 6 HOURS PRN
COMMUNITY

## 2019-11-18 RX ORDER — OMEGA-3-ACID ETHYL ESTERS 1 G/1
2 CAPSULE, LIQUID FILLED ORAL 2 TIMES DAILY
COMMUNITY

## 2019-11-18 NOTE — PRE-PROCEDURE INSTRUCTIONS
Pre-Surgery Instructions:   Medication Instructions    ibuprofen (MOTRIN) 600 mg tablet Patient was instructed by Physician and understands   omega-3-acid ethyl esters (LOVAZA) 1 g capsule Patient was instructed by Physician and understands  Pre op and bathing instructions reviewed  Pt will get hibiclens

## 2019-11-19 ENCOUNTER — APPOINTMENT (OUTPATIENT)
Dept: LAB | Facility: CLINIC | Age: 54
End: 2019-11-19
Payer: COMMERCIAL

## 2019-11-19 DIAGNOSIS — E04.1 THYROID NODULE: ICD-10-CM

## 2019-11-19 LAB
ANION GAP SERPL CALCULATED.3IONS-SCNC: 9 MMOL/L (ref 4–13)
BASOPHILS # BLD AUTO: 0.03 THOUSANDS/ΜL (ref 0–0.1)
BASOPHILS NFR BLD AUTO: 1 % (ref 0–1)
BUN SERPL-MCNC: 11 MG/DL (ref 5–25)
CALCIUM SERPL-MCNC: 9.5 MG/DL (ref 8.3–10.1)
CHLORIDE SERPL-SCNC: 107 MMOL/L (ref 100–108)
CO2 SERPL-SCNC: 27 MMOL/L (ref 21–32)
CREAT SERPL-MCNC: 0.77 MG/DL (ref 0.6–1.3)
EOSINOPHIL # BLD AUTO: 0.13 THOUSAND/ΜL (ref 0–0.61)
EOSINOPHIL NFR BLD AUTO: 3 % (ref 0–6)
ERYTHROCYTE [DISTWIDTH] IN BLOOD BY AUTOMATED COUNT: 12.4 % (ref 11.6–15.1)
GFR SERPL CREATININE-BSD FRML MDRD: 88 ML/MIN/1.73SQ M
GLUCOSE SERPL-MCNC: 82 MG/DL (ref 65–140)
HCT VFR BLD AUTO: 43.3 % (ref 34.8–46.1)
HGB BLD-MCNC: 13.9 G/DL (ref 11.5–15.4)
IMM GRANULOCYTES # BLD AUTO: 0.01 THOUSAND/UL (ref 0–0.2)
IMM GRANULOCYTES NFR BLD AUTO: 0 % (ref 0–2)
LYMPHOCYTES # BLD AUTO: 2.11 THOUSANDS/ΜL (ref 0.6–4.47)
LYMPHOCYTES NFR BLD AUTO: 44 % (ref 14–44)
MCH RBC QN AUTO: 29 PG (ref 26.8–34.3)
MCHC RBC AUTO-ENTMCNC: 32.1 G/DL (ref 31.4–37.4)
MCV RBC AUTO: 90 FL (ref 82–98)
MONOCYTES # BLD AUTO: 0.31 THOUSAND/ΜL (ref 0.17–1.22)
MONOCYTES NFR BLD AUTO: 6 % (ref 4–12)
NEUTROPHILS # BLD AUTO: 2.26 THOUSANDS/ΜL (ref 1.85–7.62)
NEUTS SEG NFR BLD AUTO: 46 % (ref 43–75)
NRBC BLD AUTO-RTO: 0 /100 WBCS
PLATELET # BLD AUTO: 301 THOUSANDS/UL (ref 149–390)
PMV BLD AUTO: 9.7 FL (ref 8.9–12.7)
POTASSIUM SERPL-SCNC: 4.3 MMOL/L (ref 3.5–5.3)
RBC # BLD AUTO: 4.79 MILLION/UL (ref 3.81–5.12)
SODIUM SERPL-SCNC: 143 MMOL/L (ref 136–145)
WBC # BLD AUTO: 4.85 THOUSAND/UL (ref 4.31–10.16)

## 2019-11-19 PROCEDURE — 80048 BASIC METABOLIC PNL TOTAL CA: CPT

## 2019-11-19 PROCEDURE — 36415 COLL VENOUS BLD VENIPUNCTURE: CPT

## 2019-11-19 PROCEDURE — 85025 COMPLETE CBC W/AUTO DIFF WBC: CPT

## 2019-11-26 ENCOUNTER — ANESTHESIA EVENT (OUTPATIENT)
Dept: PERIOP | Facility: HOSPITAL | Age: 54
End: 2019-11-26
Payer: COMMERCIAL

## 2019-11-27 ENCOUNTER — HOSPITAL ENCOUNTER (OUTPATIENT)
Facility: HOSPITAL | Age: 54
Setting detail: OUTPATIENT SURGERY
Discharge: HOME | End: 2019-11-27
Attending: SPECIALIST | Admitting: SPECIALIST
Payer: COMMERCIAL

## 2019-11-27 ENCOUNTER — ANESTHESIA (OUTPATIENT)
Dept: PERIOP | Facility: HOSPITAL | Age: 54
End: 2019-11-27
Payer: COMMERCIAL

## 2019-11-27 VITALS
DIASTOLIC BLOOD PRESSURE: 60 MMHG | BODY MASS INDEX: 26.2 KG/M2 | OXYGEN SATURATION: 94 % | RESPIRATION RATE: 16 BRPM | SYSTOLIC BLOOD PRESSURE: 132 MMHG | TEMPERATURE: 98 F | HEIGHT: 66 IN | WEIGHT: 163 LBS | HEART RATE: 69 BPM

## 2019-11-27 DIAGNOSIS — E04.1 THYROID NODULE: Primary | ICD-10-CM

## 2019-11-27 LAB
EXT PREGNANCY TEST URINE: NEGATIVE
EXT. CONTROL: NORMAL

## 2019-11-27 PROCEDURE — 81025 URINE PREGNANCY TEST: CPT | Performed by: STUDENT IN AN ORGANIZED HEALTH CARE EDUCATION/TRAINING PROGRAM

## 2019-11-27 PROCEDURE — 88307 TISSUE EXAM BY PATHOLOGIST: CPT | Performed by: PATHOLOGY

## 2019-11-27 PROCEDURE — 60220 PARTIAL REMOVAL OF THYROID: CPT | Performed by: SPECIALIST

## 2019-11-27 RX ORDER — FENTANYL CITRATE 50 UG/ML
INJECTION, SOLUTION INTRAMUSCULAR; INTRAVENOUS AS NEEDED
Status: DISCONTINUED | OUTPATIENT
Start: 2019-11-27 | End: 2019-11-27 | Stop reason: SURG

## 2019-11-27 RX ORDER — DIPHENHYDRAMINE HYDROCHLORIDE 50 MG/ML
12.5 INJECTION INTRAMUSCULAR; INTRAVENOUS ONCE AS NEEDED
Status: DISCONTINUED | OUTPATIENT
Start: 2019-11-27 | End: 2019-11-27 | Stop reason: HOSPADM

## 2019-11-27 RX ORDER — ACETAMINOPHEN 325 MG/1
TABLET ORAL
Qty: 30 TABLET | Refills: 0
Start: 2019-11-27 | End: 2020-03-02

## 2019-11-27 RX ORDER — OXYCODONE HYDROCHLORIDE 5 MG/1
5 TABLET ORAL EVERY 6 HOURS PRN
Qty: 6 TABLET | Refills: 0 | Status: SHIPPED | OUTPATIENT
Start: 2019-11-27 | End: 2020-03-02

## 2019-11-27 RX ORDER — ALBUTEROL SULFATE 2.5 MG/3ML
2.5 SOLUTION RESPIRATORY (INHALATION) AS NEEDED
Status: DISCONTINUED | OUTPATIENT
Start: 2019-11-27 | End: 2019-11-27 | Stop reason: HOSPADM

## 2019-11-27 RX ORDER — PROPOFOL 10 MG/ML
INJECTION, EMULSION INTRAVENOUS AS NEEDED
Status: DISCONTINUED | OUTPATIENT
Start: 2019-11-27 | End: 2019-11-27 | Stop reason: SURG

## 2019-11-27 RX ORDER — GLYCOPYRROLATE 0.2 MG/ML
INJECTION INTRAMUSCULAR; INTRAVENOUS AS NEEDED
Status: DISCONTINUED | OUTPATIENT
Start: 2019-11-27 | End: 2019-11-27 | Stop reason: SURG

## 2019-11-27 RX ORDER — MAGNESIUM HYDROXIDE 1200 MG/15ML
LIQUID ORAL AS NEEDED
Status: DISCONTINUED | OUTPATIENT
Start: 2019-11-27 | End: 2019-11-27 | Stop reason: HOSPADM

## 2019-11-27 RX ORDER — NEOSTIGMINE METHYLSULFATE 1 MG/ML
INJECTION INTRAVENOUS AS NEEDED
Status: DISCONTINUED | OUTPATIENT
Start: 2019-11-27 | End: 2019-11-27 | Stop reason: SURG

## 2019-11-27 RX ORDER — ACETAMINOPHEN 325 MG/1
650 TABLET ORAL EVERY 6 HOURS PRN
Status: DISCONTINUED | OUTPATIENT
Start: 2019-11-27 | End: 2019-11-27 | Stop reason: HOSPADM

## 2019-11-27 RX ORDER — HYDROMORPHONE HCL/PF 1 MG/ML
0.25 SYRINGE (ML) INJECTION
Status: DISCONTINUED | OUTPATIENT
Start: 2019-11-27 | End: 2019-11-27 | Stop reason: HOSPADM

## 2019-11-27 RX ORDER — OXYCODONE HYDROCHLORIDE 5 MG/1
5 TABLET ORAL EVERY 4 HOURS PRN
Status: DISCONTINUED | OUTPATIENT
Start: 2019-11-27 | End: 2019-11-27 | Stop reason: HOSPADM

## 2019-11-27 RX ORDER — LIDOCAINE HYDROCHLORIDE AND EPINEPHRINE 10; 10 MG/ML; UG/ML
INJECTION, SOLUTION INFILTRATION; PERINEURAL AS NEEDED
Status: DISCONTINUED | OUTPATIENT
Start: 2019-11-27 | End: 2019-11-27 | Stop reason: HOSPADM

## 2019-11-27 RX ORDER — SODIUM CHLORIDE, SODIUM LACTATE, POTASSIUM CHLORIDE, CALCIUM CHLORIDE 600; 310; 30; 20 MG/100ML; MG/100ML; MG/100ML; MG/100ML
125 INJECTION, SOLUTION INTRAVENOUS CONTINUOUS
Status: DISCONTINUED | OUTPATIENT
Start: 2019-11-27 | End: 2019-11-27 | Stop reason: HOSPADM

## 2019-11-27 RX ORDER — ONDANSETRON 2 MG/ML
INJECTION INTRAMUSCULAR; INTRAVENOUS AS NEEDED
Status: DISCONTINUED | OUTPATIENT
Start: 2019-11-27 | End: 2019-11-27 | Stop reason: SURG

## 2019-11-27 RX ORDER — METOCLOPRAMIDE HYDROCHLORIDE 5 MG/ML
5 INJECTION INTRAMUSCULAR; INTRAVENOUS ONCE AS NEEDED
Status: DISCONTINUED | OUTPATIENT
Start: 2019-11-27 | End: 2019-11-27 | Stop reason: HOSPADM

## 2019-11-27 RX ORDER — B-COMPLEX WITH VITAMIN C
2 TABLET ORAL ONCE
Status: COMPLETED | OUTPATIENT
Start: 2019-11-27 | End: 2019-11-27

## 2019-11-27 RX ORDER — LIDOCAINE HYDROCHLORIDE 10 MG/ML
0.5 INJECTION, SOLUTION EPIDURAL; INFILTRATION; INTRACAUDAL; PERINEURAL ONCE AS NEEDED
Status: DISCONTINUED | OUTPATIENT
Start: 2019-11-27 | End: 2019-11-27 | Stop reason: HOSPADM

## 2019-11-27 RX ORDER — IBUPROFEN 400 MG/1
400 TABLET ORAL EVERY 6 HOURS PRN
Status: DISCONTINUED | OUTPATIENT
Start: 2019-11-27 | End: 2019-11-27 | Stop reason: HOSPADM

## 2019-11-27 RX ORDER — ROCURONIUM BROMIDE 10 MG/ML
INJECTION, SOLUTION INTRAVENOUS AS NEEDED
Status: DISCONTINUED | OUTPATIENT
Start: 2019-11-27 | End: 2019-11-27 | Stop reason: SURG

## 2019-11-27 RX ORDER — DEXAMETHASONE SODIUM PHOSPHATE 10 MG/ML
INJECTION, SOLUTION INTRAMUSCULAR; INTRAVENOUS AS NEEDED
Status: DISCONTINUED | OUTPATIENT
Start: 2019-11-27 | End: 2019-11-27 | Stop reason: SURG

## 2019-11-27 RX ORDER — ONDANSETRON 2 MG/ML
4 INJECTION INTRAMUSCULAR; INTRAVENOUS ONCE AS NEEDED
Status: COMPLETED | OUTPATIENT
Start: 2019-11-27 | End: 2019-11-27

## 2019-11-27 RX ORDER — DEXMEDETOMIDINE HYDROCHLORIDE 100 UG/ML
INJECTION, SOLUTION INTRAVENOUS AS NEEDED
Status: DISCONTINUED | OUTPATIENT
Start: 2019-11-27 | End: 2019-11-27 | Stop reason: SURG

## 2019-11-27 RX ORDER — FENTANYL CITRATE/PF 50 MCG/ML
25 SYRINGE (ML) INJECTION
Status: DISCONTINUED | OUTPATIENT
Start: 2019-11-27 | End: 2019-11-27 | Stop reason: HOSPADM

## 2019-11-27 RX ORDER — MIDAZOLAM HYDROCHLORIDE 2 MG/2ML
INJECTION, SOLUTION INTRAMUSCULAR; INTRAVENOUS AS NEEDED
Status: DISCONTINUED | OUTPATIENT
Start: 2019-11-27 | End: 2019-11-27 | Stop reason: SURG

## 2019-11-27 RX ADMIN — OXYCODONE HYDROCHLORIDE 5 MG: 5 TABLET ORAL at 15:41

## 2019-11-27 RX ADMIN — FENTANYL CITRATE 25 MCG: 50 INJECTION INTRAMUSCULAR; INTRAVENOUS at 13:50

## 2019-11-27 RX ADMIN — FENTANYL CITRATE 25 MCG: 50 INJECTION INTRAMUSCULAR; INTRAVENOUS at 13:34

## 2019-11-27 RX ADMIN — ROCURONIUM BROMIDE 10 MG: 10 SOLUTION INTRAVENOUS at 13:44

## 2019-11-27 RX ADMIN — DEXMEDETOMIDINE HYDROCHLORIDE 12 MCG: 100 INJECTION, SOLUTION INTRAVENOUS at 13:47

## 2019-11-27 RX ADMIN — FENTANYL CITRATE 50 MCG: 50 INJECTION INTRAMUSCULAR; INTRAVENOUS at 13:41

## 2019-11-27 RX ADMIN — DEXMEDETOMIDINE HYDROCHLORIDE 4 MCG: 100 INJECTION, SOLUTION INTRAVENOUS at 13:57

## 2019-11-27 RX ADMIN — FENTANYL CITRATE 25 MCG: 50 INJECTION, SOLUTION INTRAMUSCULAR; INTRAVENOUS at 15:10

## 2019-11-27 RX ADMIN — ONDANSETRON 4 MG: 2 INJECTION INTRAMUSCULAR; INTRAVENOUS at 14:20

## 2019-11-27 RX ADMIN — MIDAZOLAM HYDROCHLORIDE 2 MG: 1 INJECTION, SOLUTION INTRAMUSCULAR; INTRAVENOUS at 13:27

## 2019-11-27 RX ADMIN — ONDANSETRON 4 MG: 2 INJECTION INTRAMUSCULAR; INTRAVENOUS at 15:33

## 2019-11-27 RX ADMIN — NEOSTIGMINE METHYLSULFATE 3 MG: 1 INJECTION INTRAVENOUS at 14:27

## 2019-11-27 RX ADMIN — DEXAMETHASONE SODIUM PHOSPHATE 8 MG: 10 INJECTION, SOLUTION INTRAMUSCULAR; INTRAVENOUS at 13:39

## 2019-11-27 RX ADMIN — OYSTER SHELL CALCIUM WITH VITAMIN D 2 TABLET: 500; 200 TABLET, FILM COATED ORAL at 15:42

## 2019-11-27 RX ADMIN — SODIUM CHLORIDE, SODIUM LACTATE, POTASSIUM CHLORIDE, AND CALCIUM CHLORIDE: .6; .31; .03; .02 INJECTION, SOLUTION INTRAVENOUS at 13:00

## 2019-11-27 RX ADMIN — PHENYLEPHRINE HYDROCHLORIDE 100 MCG: 10 INJECTION INTRAVENOUS at 13:48

## 2019-11-27 RX ADMIN — FENTANYL CITRATE 25 MCG: 50 INJECTION, SOLUTION INTRAMUSCULAR; INTRAVENOUS at 15:06

## 2019-11-27 RX ADMIN — PHENYLEPHRINE HYDROCHLORIDE 100 MCG: 10 INJECTION INTRAVENOUS at 14:00

## 2019-11-27 RX ADMIN — PROPOFOL 150 MG: 10 INJECTION, EMULSION INTRAVENOUS at 13:34

## 2019-11-27 RX ADMIN — ROCURONIUM BROMIDE 25 MG: 10 SOLUTION INTRAVENOUS at 13:34

## 2019-11-27 RX ADMIN — GLYCOPYRROLATE 0.4 MG: 0.2 INJECTION, SOLUTION INTRAMUSCULAR; INTRAVENOUS at 14:27

## 2019-11-27 RX ADMIN — DEXMEDETOMIDINE HYDROCHLORIDE 4 MCG: 100 INJECTION, SOLUTION INTRAVENOUS at 14:00

## 2019-11-27 NOTE — DISCHARGE INSTRUCTIONS
Partial Thyroidectomy   WHAT YOU NEED TO KNOW:   A partial thyroidectomy is the removal of part of your thyroid  Your thyroid is a gland in the front lower part of your neck  The thyroid makes hormones that regulate your metabolism, body temperature, and heart rate  Smaller glands called parathyroids regulate the level of calcium in your blood  You have 4 parathyroids, located on the sides of your thyroid gland  Your parathyroids will not be removed during this surgery  DISCHARGE INSTRUCTIONS:   Medicines: The following medicines may  be ordered by your healthcare provider:  · Pain medicine  can help take away or decrease pain  Do not wait until the pain is severe before you take your medicine  · Thyroid medicine  may be given if your thyroid hormone level is too low  · Take your medicine as directed  Contact your healthcare provider if you think your medicine is not helping or if you have side effects  Tell him or her if you are allergic to any medicine  Keep a list of the medicines, vitamins, and herbs you take  Include the amounts, and when and why you take them  Bring the list or the pill bottles to follow-up visits  Carry your medicine list with you in case of an emergency  Follow up with your endocrinologist or surgeon as directed: You will need to return to have your wound checked and stitches removed  You may also need blood tests to monitor your calcium, parathyroid, and thyroid hormone levels  Write down your questions so you remember to ask them during your visits  Self-care:   · Rest when you need to while you heal after surgery  Slowly start to do more each day  Return to your daily activities as directed  · Wound care:  Carefully wash your skin near the incision wound area with soap and water  Dry the area and put on new, clean bandages as directed  Change your bandages when they get wet or dirty  You can use a mild body lotion to improve the scar       · Swallowing and voice changes: You may have a sore throat, hoarse voice, or difficulty swallowing after surgery  These symptoms should go away after a few days  · Supplements:  Ask your endocrinologist if you need to take calcium or vitamin D  Ask how much to take and how often to take it  Contact your endocrinologist or surgeon if:   · You have a fever or chills  · You feel very tired and cold, gain weight for no reason, and your skin is very dry  · You vomit several times in a row  · Your incision is red, swollen, or draining pus  · You have new voice weakness or hoarseness, or it is getting worse  · You have questions or concerns about your condition or care  Seek care immediately or call 911 if:   · You have sudden tingling or muscle cramps in your face, arm, or leg  · You have muscle spasms in your legs and feet that do not go away  · Blood soaks through your bandage  · Your stitches come apart  · You have sudden swelling in your neck or difficulty swallowing  · You have trouble breathing  · You have a seizure  © 2017 [employer] [employer]  Information is for End User's use only and may not be sold, redistributed or otherwise used for commercial purposes  All illustrations and images included in CareNotes® are the copyrighted property of A D A M , Inc  or [employer]  The above information is an  only  It is not intended as medical advice for individual conditions or treatments  Talk to your doctor, nurse or pharmacist before following any medical regimen to see if it is safe and effective for you  Call Dr Aramis Pascal with any questions or concerns: office ; mobile  (urgent issues)

## 2019-11-27 NOTE — OP NOTE
OPERATIVE REPORT  PATIENT NAME: Kaia Marquez    :  1965  MRN: [id number]  Pt Location: AN OR ROOM 01    SURGERY DATE: 2019    Surgeon(s) and Role:     * Nik Freeman MD - Primary     * Terra Liang MD - Assisting    Preop Diagnosis:  Thyroid nodule [E04 1]    Post-Op Diagnosis Codes: * Thyroid nodule [E04 1]    Procedure(s):  THYROID LOBECTOMY(Left)    Specimen(s):  ID Type Source Tests Collected by Time Destination   1 : LEFT THYROID LOBE NODULE Tissue Thyroid, Left TISSUE Lennox Peak, MD 2019 1418        Estimated Blood Loss:   Minimal    Drains:  None    Anesthesia Type:   General    Operative Indications:  Left thyroid nodule, progressive in size with associated mass effect, also [place] 3 on fine needle aspiration biopsy  Operative Findings:  Nodule arising off of left lobe  Upper and lower parathyroid glands, recurrent laryngeal nerve identified and preserved  Complications:   None    Procedure and Technique:  Kaia Marquez was positively identified and transferred onto the operating table in the supine position  Appropriate monitoring devices were put in place, anesthesia was induced and the patient was intubated without difficulty  A shoulder roll was placed, and the patients neck was examined  An appropriate site for skin incision was demarcated 1cm below the cricoid cartilage  Before proceeding further, the timeout process was completed  Local anesthesia in the form of 1% lidocaine with 1/100,000 epinephrine was then injected to the marked site  The patients neck was then prepped and draped in the usual sterile fashion  Skin incision was then made at the marked site in a transverse fashion using a 15 blade  Dissection continued through subcutaneous tissues and platysma using the 15 blade  Dissection then continued in midline in between strap musculature using DeBakey forceps and Bovie cautery    Strap muscles were then elevated off the underlying thyroid gland on the left side using Bovie cautery and blunt dissection  The strap muscles were then held in a retracted position using an [employer] Wilder retractor  Dissection then continued around the lobe of the thyroid gland, immediately adjacent to the capsule of the gland using bipolar cautery  This dissection was carried out along the superior aspect of the isthmus, extending along the medial aspect of the upper pole  The superior pole was retracted medially and inferiorly, and dissection continued around the lateral aspect of the lsuperior lobe  The superior pedicle was divided using bipolar cautery  This allowed further reflection and retraction of the gland medially, and dissection continued inferiorly, with care taken to remain as close as possible to the capsule of the gland to minimize risk of injury or sacrifice of parathyroid glands  With continued dissection, the recurrent laryngeal nerve was identified  The thyroid isthmus was then divided using  Bovie cautery  Remaining tissue attachments at [facility] ligament were then divided using Bipolar cautery, with care taken to limit extent of dissection at the point of entry of the recurrent laryngeal nerve  The left thyroid lobe was removed and examined, and no adherent parathyroid glands were noted  The specimen was then set aside to send to pathology for permanent section evaluation  The surgical site was irrigated with saline, and hemostasis was ensured using bipolar cautery  Norberto hemostatic agent was applied  The surgical site was then closed in multiple layers  Strap muscles were re-approximated across midline using 3-0 Vicryl stitches in a horizontal mattress fashion, and the same stitch was used in an interrupted fashion to reapproximate the plastysma and tissue in midline at the same plane  Skin was closed using a 4-0 Monocryl stitch in a running sub-cuticular fashion, followed by a Steristrip      Anesthesia was then reversed, and the patient was awakened, extubated and taken to the recovery room in stable condition  All counts were correct at the end of the case, and no complications were encountered        I was present for the entire procedure    Patient Disposition:  PACU  and extubated and stable    SIGNATURE: Garcia Avendano MD  DATE: November 27, 2019  TIME: 2:37 PM

## 2019-11-27 NOTE — INTERVAL H&P NOTE
H&P reviewed  After examining the patient I find no changes in the patients condition since the H&P had been written      Vitals:    11/27/19 1218   BP: 133/76   Pulse: 61   Resp: 16   Temp: (!) 97 4 °F (36 3 °C)   SpO2: 95%

## 2019-11-27 NOTE — ANESTHESIA POSTPROCEDURE EVALUATION
Post-Op Assessment Note    CV Status:  Stable  Pain Score: 0    Pain management: adequate     Mental Status:  Alert and awake   Hydration Status:  Euvolemic   PONV Controlled:  Controlled   Airway Patency:  Patent and adequate   Post Op Vitals Reviewed: Yes      Staff: CRNA           BP   140/72   Temp  97 3   Pulse  60   Resp   15   SpO2   98%

## 2019-11-27 NOTE — ANESTHESIA PREPROCEDURE EVALUATION
Review of Systems/Medical History  Patient summary reviewed  Chart reviewed  History of anesthetic complications (remote PONV) PONV    Cardiovascular  Negative cardio ROS Exercise tolerance (METS): >4,     Pulmonary  Negative pulmonary ROS        GI/Hepatic  Negative GI/hepatic ROS          Negative  ROS        Endo/Other  History of thyroid disease (nodule - for hemithyroidectomy) ,      GYN  Negative gynecology ROS          Hematology  Negative hematology ROS      Musculoskeletal  Negative musculoskeletal ROS        Neurology  Negative neurology ROS      Psychology   Negative psychology ROS              Physical Exam    Airway    Mallampati score: I  TM Distance: >3 FB  Neck ROM: full     Dental   No notable dental hx     Cardiovascular  Comment: Negative ROS,     Pulmonary      Other Findings        Anesthesia Plan  ASA Score- 1     Anesthesia Type- general with ASA Monitors  Additional Monitors:   Airway Plan: ETT  Plan Factors-    Induction- intravenous  Postoperative Plan-     Informed Consent- Anesthetic plan and risks discussed with patient and spouse  I personally reviewed this patient with the CRNA  Discussed and agreed on the Anesthesia Plan with the CRNA  Bridgette Mccray

## 2019-12-09 ENCOUNTER — TELEPHONE (OUTPATIENT)
Dept: FAMILY MEDICINE CLINIC | Facility: CLINIC | Age: 54
End: 2019-12-09

## 2019-12-09 NOTE — TELEPHONE ENCOUNTER
Patient called and wanted to know if Dr Bullard still wants to see her tomorrow, as she has been through surgery recently and has had lots of blood work done and and [address]    Please advise

## 2020-03-02 ENCOUNTER — ANNUAL EXAM (OUTPATIENT)
Dept: OBGYN CLINIC | Facility: CLINIC | Age: 55
End: 2020-03-02
Payer: COMMERCIAL

## 2020-03-02 VITALS
SYSTOLIC BLOOD PRESSURE: 114 MMHG | DIASTOLIC BLOOD PRESSURE: 62 MMHG | BODY MASS INDEX: 26.81 KG/M2 | HEIGHT: 66 IN | WEIGHT: 166.8 LBS

## 2020-03-02 DIAGNOSIS — Z12.31 ENCOUNTER FOR SCREENING MAMMOGRAM FOR MALIGNANT NEOPLASM OF BREAST: ICD-10-CM

## 2020-03-02 DIAGNOSIS — Z01.419 ENCOUNTER FOR GYNECOLOGICAL EXAMINATION (GENERAL) (ROUTINE) WITHOUT ABNORMAL FINDINGS: Primary | ICD-10-CM

## 2020-03-02 PROCEDURE — S0612 ANNUAL GYNECOLOGICAL EXAMINA: HCPCS | Performed by: OBSTETRICS & GYNECOLOGY

## 2020-03-02 PROCEDURE — 3008F BODY MASS INDEX DOCD: CPT | Performed by: OBSTETRICS & GYNECOLOGY

## 2020-03-02 NOTE — PROGRESS NOTES
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  Speech pathology/Special Ed focus   is  at [employer]  Last Pap: 2/19/18 - Normal Cytology, Negative HPV  Last Mammo: 5/28/19 - Negative   Last Colonoscopy: 2015 - return 2025    We reviewed ASCCP guidelines for Pap testing  Family hx of breast cancer: no  Family hx of ovarian cancer:  no  Family hx of colon cancer: no      Current Outpatient Medications:     omega-3-acid ethyl esters (LOVAZA) 1 g capsule, Take 2 g by mouth 2 (two) times a day, Disp: , Rfl:     acetaminophen (TYLENOL) 325 mg tablet, 2, by mouth, every 6 hours as needed for mild to moderate pain   (Patient not taking: Reported on 3/2/2020), Disp: 30 tablet, Rfl: 0    ibuprofen (MOTRIN) 600 mg tablet, Take by mouth every 6 (six) hours as needed for mild pain, Disp: , Rfl:     oxyCODONE (ROXICODONE) 5 mg immediate release tablet, Take 1 tablet (5 mg total) by mouth every 6 (six) hours as needed for severe painMax Daily Amount: 20 mg (Patient not taking: Reported on 3/2/2020), Disp: 6 tablet, Rfl: 0  Social History     Socioeconomic History    Marital status: /Civil Union     Spouse name: Not on file    Number of children: Not on file    Years of education: Not on file    Highest education level: Not on file   Occupational History    Not on file   Social Needs    Financial resource strain: Not on file    Food insecurity:     Worry: Not on file     Inability: Not on file    Transportation needs:     Medical: Not on file     Non-medical: Not on file   Tobacco Use    Smoking status: Former Smoker     Last attempt to quit: 1999     Years since quittin 3    Smokeless tobacco: Never Used   Substance and Sexual Activity    Alcohol use: Yes     Frequency: 2-4 times a month     Drinks per session: 1 or 2     Comment: social    Drug use: No    Sexual activity: Yes     Partners: Male     Birth control/protection: Rhythm   Lifestyle    Physical activity:     Days per week: Not on file     Minutes per session: Not on file    Stress: Not on file   Relationships    Social connections:     Talks on phone: Not on file     Gets together: Not on file     Attends Hindu service: Not on file     Active member of club or organization: Not on file     Attends meetings of clubs or organizations: Not on file     Relationship status: Not on file    Intimate partner violence:     Fear of current or ex partner: Not on file     Emotionally abused: Not on file     Physically abused: Not on file     Forced sexual activity: Not on file   Other Topics Concern    Not on file   Social History Narrative    Daily coffee consumption (___Cups/Day)    Exercise Frequency (times/Week)     Family History   Problem Relation Age of Onset    Hypertension Mother    [facility] Lymphoma Mother     No Known Problems Father     Cancer Brother 32        Adrenal    Hypertension Family     Irritable bowel syndrome Family     Cancer Family         Intestinal Cancer    Varicose Veins Family      Past Medical History:   Diagnosis Date    Anxiety     Chest pain     last assessed - 2015    Disease of thyroid gland     Headache     Hypokalemia     last assessed - 2015    PONV (postoperative nausea and vomiting)     Shingles 2018        Review of Systems   Respiratory: Negative      Cardiovascular: Negative  Gastrointestinal: Negative for constipation and diarrhea  Genitourinary: Negative for difficulty urinating, pelvic pain, vaginal bleeding, vaginal discharge, itching or odor  O:  Blood pressure 114/62, height 5' 6" (1 676 m), weight 75 7 kg (166 lb 12 8 oz)  Patient appears well and is not in distress  Breasts are symmetrical without mass, tenderness, nipple discharge, skin changes or adenopathy  Abdomen is soft and nontender without masses  External genitals are normal without lesions or rashes  Urethral meatus and urethra are normal  Bladder is normal to palpation  Vagina is normal without discharge or bleeding  Cervix is normal without discharge or lesion  Uterus is normal, mobile, nontender without palpable mass  Adnexa are normal, nontender, without palpable mass  A:  Yearly exam      P:   Pap up to date   Mammo slip given   Colonoscopy up to date   Call with any bleeding going forward     Discussed lack of dryness and libido  Advised coconut oil  RTO one year for yearly exam or sooner as needed

## 2020-03-03 PROBLEM — E89.0 POST-OPERATIVE HYPOTHYROIDISM: Status: ACTIVE | Noted: 2020-03-03

## 2020-06-18 ENCOUNTER — TELEPHONE (OUTPATIENT)
Dept: OBGYN CLINIC | Facility: CLINIC | Age: 55
End: 2020-06-18

## 2020-06-29 ENCOUNTER — OFFICE VISIT (OUTPATIENT)
Dept: URGENT CARE | Facility: CLINIC | Age: 55
End: 2020-06-29
Payer: COMMERCIAL

## 2020-06-29 VITALS — HEIGHT: 66 IN | BODY MASS INDEX: 27.16 KG/M2 | WEIGHT: 169 LBS

## 2020-06-29 DIAGNOSIS — Z11.59 SPECIAL SCREENING EXAMINATION FOR UNSPECIFIED VIRAL DISEASE: Primary | ICD-10-CM

## 2020-06-29 PROCEDURE — S9083 URGENT CARE CENTER GLOBAL: HCPCS | Performed by: PHYSICIAN ASSISTANT

## 2020-06-29 PROCEDURE — U0003 INFECTIOUS AGENT DETECTION BY NUCLEIC ACID (DNA OR RNA); SEVERE ACUTE RESPIRATORY SYNDROME CORONAVIRUS 2 (SARS-COV-2) (CORONAVIRUS DISEASE [COVID-19]), AMPLIFIED PROBE TECHNIQUE, MAKING USE OF HIGH THROUGHPUT TECHNOLOGIES AS DESCRIBED BY CMS-2020-01-R: HCPCS | Performed by: PHYSICIAN ASSISTANT

## 2020-06-29 PROCEDURE — G0382 LEV 3 HOSP TYPE B ED VISIT: HCPCS | Performed by: PHYSICIAN ASSISTANT

## 2020-07-01 LAB — SARS-COV-2 RNA SPEC QL NAA+PROBE: NOT DETECTED

## 2020-07-03 ENCOUNTER — TELEPHONE (OUTPATIENT)
Dept: OTHER | Facility: OTHER | Age: 55
End: 2020-07-03

## 2020-07-03 NOTE — TELEPHONE ENCOUNTER
Your test for COVID-19, also known as novel coronavirus, came back negative  You do not have COVID-19  If you have any additional questions, we can schedule a virtual visit for you with a provider or [facility] the [phone] Option 7 for care advice  For additional information , please visit the Coronavirus FAQ on the [id number] [address]  ([facility])  Patient denied having any questions

## 2020-08-21 ENCOUNTER — HOSPITAL ENCOUNTER (OUTPATIENT)
Dept: BONE DENSITY | Facility: IMAGING CENTER | Age: 55
Discharge: HOME | End: 2020-08-21
Payer: COMMERCIAL

## 2020-08-21 VITALS — HEIGHT: 66 IN | WEIGHT: 158 LBS | BODY MASS INDEX: 25.39 KG/M2

## 2020-08-21 DIAGNOSIS — Z12.31 VISIT FOR SCREENING MAMMOGRAM: ICD-10-CM

## 2020-08-21 DIAGNOSIS — Z12.31 ENCOUNTER FOR SCREENING MAMMOGRAM FOR MALIGNANT NEOPLASM OF BREAST: ICD-10-CM

## 2020-08-21 PROCEDURE — 77067 SCR MAMMO BI INCL CAD: CPT

## 2020-08-21 PROCEDURE — 77063 BREAST TOMOSYNTHESIS BI: CPT

## 2020-10-05 ENCOUNTER — CLINICAL SUPPORT (OUTPATIENT)
Dept: FAMILY MEDICINE CLINIC | Facility: CLINIC | Age: 55
End: 2020-10-05
Payer: COMMERCIAL

## 2020-10-05 DIAGNOSIS — Z11.1 ENCOUNTER FOR PPD TEST: Primary | ICD-10-CM

## 2020-10-05 PROCEDURE — 86580 TB INTRADERMAL TEST: CPT

## 2020-10-07 ENCOUNTER — CLINICAL SUPPORT (OUTPATIENT)
Dept: FAMILY MEDICINE CLINIC | Facility: CLINIC | Age: 55
End: 2020-10-07

## 2020-10-07 DIAGNOSIS — Z11.1 ENCOUNTER FOR PPD SKIN TEST READING: Primary | ICD-10-CM

## 2020-10-07 LAB
INDURATION: 0 MM
TB SKIN TEST: NEGATIVE

## 2020-10-07 PROCEDURE — RECHECK

## 2020-10-21 ENCOUNTER — IMMUNIZATIONS (OUTPATIENT)
Dept: FAMILY MEDICINE CLINIC | Facility: CLINIC | Age: 55
End: 2020-10-21
Payer: COMMERCIAL

## 2020-10-21 DIAGNOSIS — Z23 ENCOUNTER FOR IMMUNIZATION: ICD-10-CM

## 2020-10-21 PROCEDURE — 90682 RIV4 VACC RECOMBINANT DNA IM: CPT

## 2020-10-21 PROCEDURE — 90471 IMMUNIZATION ADMIN: CPT

## 2020-11-05 PROBLEM — J34.89 NASAL PAIN: Status: ACTIVE | Noted: 2020-11-05

## 2020-11-19 ENCOUNTER — HOSPITAL ENCOUNTER (OUTPATIENT)
Dept: RADIOLOGY | Facility: HOSPITAL | Age: 55
Discharge: HOME | End: 2020-11-19
Payer: COMMERCIAL

## 2020-11-19 ENCOUNTER — OFFICE VISIT (OUTPATIENT)
Dept: FAMILY MEDICINE CLINIC | Facility: CLINIC | Age: 55
End: 2020-11-19
Payer: COMMERCIAL

## 2020-11-19 VITALS
RESPIRATION RATE: 14 BRPM | OXYGEN SATURATION: 99 % | WEIGHT: 166.8 LBS | DIASTOLIC BLOOD PRESSURE: 80 MMHG | HEART RATE: 66 BPM | SYSTOLIC BLOOD PRESSURE: 124 MMHG | TEMPERATURE: 98.2 F | BODY MASS INDEX: 26.81 KG/M2 | HEIGHT: 66 IN

## 2020-11-19 DIAGNOSIS — J34.89 SINUS PRESSURE: ICD-10-CM

## 2020-11-19 DIAGNOSIS — E89.0 POST-OPERATIVE HYPOTHYROIDISM: ICD-10-CM

## 2020-11-19 DIAGNOSIS — Z00.00 ANNUAL PHYSICAL EXAM: Primary | ICD-10-CM

## 2020-11-19 DIAGNOSIS — Z13.1 SCREENING FOR DIABETES MELLITUS: ICD-10-CM

## 2020-11-19 DIAGNOSIS — Z13.220 ENCOUNTER FOR LIPID SCREENING FOR CARDIOVASCULAR DISEASE: ICD-10-CM

## 2020-11-19 DIAGNOSIS — Z13.6 ENCOUNTER FOR LIPID SCREENING FOR CARDIOVASCULAR DISEASE: ICD-10-CM

## 2020-11-19 DIAGNOSIS — J34.89 NASAL PAIN: ICD-10-CM

## 2020-11-19 PROCEDURE — 3008F BODY MASS INDEX DOCD: CPT | Performed by: FAMILY MEDICINE

## 2020-11-19 PROCEDURE — 70486 CT MAXILLOFACIAL W/O DYE: CPT

## 2020-11-19 PROCEDURE — G1004 CDSM NDSC: HCPCS

## 2020-11-19 PROCEDURE — 1036F TOBACCO NON-USER: CPT | Performed by: FAMILY MEDICINE

## 2020-11-19 PROCEDURE — 99396 PREV VISIT EST AGE 40-64: CPT | Performed by: FAMILY MEDICINE

## 2020-11-24 PROBLEM — R09.82 PND (POST-NASAL DRIP): Status: ACTIVE | Noted: 2020-11-24

## 2020-11-24 PROBLEM — J34.89 SINUS PRESSURE: Status: ACTIVE | Noted: 2020-11-24

## 2020-11-25 DIAGNOSIS — E03.9 HYPOTHYROIDISM, UNSPECIFIED TYPE: ICD-10-CM

## 2020-11-25 LAB
ALBUMIN SERPL-MCNC: 4.1 G/DL (ref 3.6–5.1)
ALBUMIN/GLOB SERPL: 1.8 (CALC) (ref 1–2.5)
ALP SERPL-CCNC: 31 U/L (ref 37–153)
ALT SERPL-CCNC: 17 U/L (ref 6–29)
AST SERPL-CCNC: 15 U/L (ref 10–35)
BILIRUB SERPL-MCNC: 0.7 MG/DL (ref 0.2–1.2)
BUN SERPL-MCNC: 17 MG/DL (ref 7–25)
BUN/CREAT SERPL: ABNORMAL (CALC) (ref 6–22)
CALCIUM SERPL-MCNC: 9.8 MG/DL (ref 8.6–10.4)
CHLORIDE SERPL-SCNC: 107 MMOL/L (ref 98–110)
CHOLEST SERPL-MCNC: 183 MG/DL
CHOLEST/HDLC SERPL: 3.6 (CALC)
CO2 SERPL-SCNC: 30 MMOL/L (ref 20–32)
CREAT SERPL-MCNC: 0.85 MG/DL (ref 0.5–1.05)
ERYTHROCYTE [DISTWIDTH] IN BLOOD BY AUTOMATED COUNT: 13 % (ref 11–15)
GLOBULIN SER CALC-MCNC: 2.3 G/DL (CALC) (ref 1.9–3.7)
GLUCOSE SERPL-MCNC: 90 MG/DL (ref 65–99)
HCT VFR BLD AUTO: 43 % (ref 35–45)
HDLC SERPL-MCNC: 51 MG/DL
HGB BLD-MCNC: 13.8 G/DL (ref 11.7–15.5)
LDLC SERPL CALC-MCNC: 110 MG/DL (CALC)
MCH RBC QN AUTO: 29.2 PG (ref 27–33)
MCHC RBC AUTO-ENTMCNC: 32.1 G/DL (ref 32–36)
MCV RBC AUTO: 90.9 FL (ref 80–100)
NONHDLC SERPL-MCNC: 132 MG/DL (CALC)
PLATELET # BLD AUTO: 314 THOUSAND/UL (ref 140–400)
PMV BLD REES-ECKER: 10.3 FL (ref 7.5–12.5)
POTASSIUM SERPL-SCNC: 4.4 MMOL/L (ref 3.5–5.3)
PROT SERPL-MCNC: 6.4 G/DL (ref 6.1–8.1)
RBC # BLD AUTO: 4.73 MILLION/UL (ref 3.8–5.1)
SL AMB EGFR AFRICAN AMERICAN: 89 ML/MIN/1.73M2
SL AMB EGFR NON AFRICAN AMERICAN: 77 ML/MIN/1.73M2
SODIUM SERPL-SCNC: 142 MMOL/L (ref 135–146)
TRIGL SERPL-MCNC: 110 MG/DL
TSH SERPL-ACNC: 4.5 MIU/L
WBC # BLD AUTO: 5.1 THOUSAND/UL (ref 3.8–10.8)

## 2020-11-25 RX ORDER — LEVOTHYROXINE SODIUM 0.07 MG/1
75 TABLET ORAL DAILY
Qty: 30 TABLET | Refills: 5 | Status: SHIPPED | OUTPATIENT
Start: 2020-11-25 | End: 2021-05-24

## 2021-01-26 ENCOUNTER — TRANSCRIBE ORDERS (OUTPATIENT)
Dept: LAB | Facility: CLINIC | Age: 56
End: 2021-01-26

## 2021-01-26 ENCOUNTER — IMMUNIZATIONS (OUTPATIENT)
Dept: FAMILY MEDICINE CLINIC | Facility: HOSPITAL | Age: 56
End: 2021-01-26
Payer: COMMERCIAL

## 2021-01-26 ENCOUNTER — LAB (OUTPATIENT)
Dept: LAB | Facility: CLINIC | Age: 56
End: 2021-01-26
Payer: COMMERCIAL

## 2021-01-26 DIAGNOSIS — E03.9 HYPOTHYROIDISM, UNSPECIFIED TYPE: ICD-10-CM

## 2021-01-26 DIAGNOSIS — E89.0 POST-OPERATIVE HYPOTHYROIDISM: ICD-10-CM

## 2021-01-26 DIAGNOSIS — Z23 ENCOUNTER FOR IMMUNIZATION: Primary | ICD-10-CM

## 2021-01-26 DIAGNOSIS — E04.1 THYROID NODULE: ICD-10-CM

## 2021-01-26 LAB — TSH SERPL DL<=0.05 MIU/L-ACNC: 2.66 UIU/ML (ref 0.36–3.74)

## 2021-01-26 PROCEDURE — 0011A SARS-COV-2 / COVID-19 MRNA VACCINE (MODERNA) 100 MCG: CPT

## 2021-01-26 PROCEDURE — 36415 COLL VENOUS BLD VENIPUNCTURE: CPT

## 2021-01-26 PROCEDURE — 84443 ASSAY THYROID STIM HORMONE: CPT

## 2021-01-26 PROCEDURE — 91301 SARS-COV-2 / COVID-19 MRNA VACCINE (MODERNA) 100 MCG: CPT

## 2021-01-29 ENCOUNTER — TELEPHONE (OUTPATIENT)
Dept: FAMILY MEDICINE CLINIC | Facility: CLINIC | Age: 56
End: 2021-01-29

## 2021-01-29 DIAGNOSIS — Z20.822 EXPOSURE TO COVID-19 VIRUS: Primary | ICD-10-CM

## 2021-01-29 NOTE — TELEPHONE ENCOUNTER
Myriam's  tested positive on Wednesday for [place]  His symptoms include being fatigue, stuffy nose, and cough  He is self isolating in the house since 01/27/2021  Myriam's last contact with him was Wednesday morning  Elesa Setting at this time does not have symptoms, but is asking to be tested on Monday 02/01/21 for COVID 19?

## 2021-01-29 NOTE — TELEPHONE ENCOUNTER
Order placed  She can go to [facility] or [facility] [place]  I suggest she get a pulse oximeter for the house so that we can monitor her husbands oxygenation levels   We can do a virtual visit with him on Monday as he is our patient

## 2021-01-29 NOTE — TELEPHONE ENCOUNTER
Spoke with pt and advised that testing was ordered were to go and to purchase pulse ox to monitor husbands O2

## 2021-02-03 DIAGNOSIS — Z20.822 EXPOSURE TO COVID-19 VIRUS: ICD-10-CM

## 2021-02-03 PROCEDURE — U0003 INFECTIOUS AGENT DETECTION BY NUCLEIC ACID (DNA OR RNA); SEVERE ACUTE RESPIRATORY SYNDROME CORONAVIRUS 2 (SARS-COV-2) (CORONAVIRUS DISEASE [COVID-19]), AMPLIFIED PROBE TECHNIQUE, MAKING USE OF HIGH THROUGHPUT TECHNOLOGIES AS DESCRIBED BY CMS-2020-01-R: HCPCS | Performed by: FAMILY MEDICINE

## 2021-02-03 PROCEDURE — U0005 INFEC AGEN DETEC AMPLI PROBE: HCPCS | Performed by: FAMILY MEDICINE

## 2021-02-04 LAB — SARS-COV-2 RNA RESP QL NAA+PROBE: NEGATIVE

## 2021-02-10 ENCOUNTER — TELEPHONE (OUTPATIENT)
Dept: FAMILY MEDICINE CLINIC | Facility: CLINIC | Age: 56
End: 2021-02-10

## 2021-02-10 DIAGNOSIS — Z20.822 EXPOSURE TO COVID-19 VIRUS: Primary | ICD-10-CM

## 2021-02-10 DIAGNOSIS — Z20.822 EXPOSURE TO COVID-19 VIRUS: ICD-10-CM

## 2021-02-10 LAB — SARS-COV-2 RNA RESP QL NAA+PROBE: NEGATIVE

## 2021-02-10 PROCEDURE — U0003 INFECTIOUS AGENT DETECTION BY NUCLEIC ACID (DNA OR RNA); SEVERE ACUTE RESPIRATORY SYNDROME CORONAVIRUS 2 (SARS-COV-2) (CORONAVIRUS DISEASE [COVID-19]), AMPLIFIED PROBE TECHNIQUE, MAKING USE OF HIGH THROUGHPUT TECHNOLOGIES AS DESCRIBED BY CMS-2020-01-R: HCPCS | Performed by: FAMILY MEDICINE

## 2021-02-10 PROCEDURE — U0005 INFEC AGEN DETEC AMPLI PROBE: HCPCS | Performed by: FAMILY MEDICINE

## 2021-02-10 NOTE — TELEPHONE ENCOUNTER
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

## 2021-02-10 NOTE — TELEPHONE ENCOUNTER
Yes we can test her again, Please don't tell patients that we can't  If they have new symptoms we will test  I will place the order

## 2021-02-23 ENCOUNTER — IMMUNIZATIONS (OUTPATIENT)
Dept: FAMILY MEDICINE CLINIC | Facility: HOSPITAL | Age: 56
End: 2021-02-23

## 2021-02-23 DIAGNOSIS — Z23 ENCOUNTER FOR IMMUNIZATION: Primary | ICD-10-CM

## 2021-02-23 PROCEDURE — 0012A SARS-COV-2 / COVID-19 MRNA VACCINE (MODERNA) 100 MCG: CPT

## 2021-02-23 PROCEDURE — 91301 SARS-COV-2 / COVID-19 MRNA VACCINE (MODERNA) 100 MCG: CPT

## 2021-04-19 ENCOUNTER — ANNUAL EXAM (OUTPATIENT)
Dept: OBGYN CLINIC | Facility: CLINIC | Age: 56
End: 2021-04-19
Payer: COMMERCIAL

## 2021-04-19 VITALS
SYSTOLIC BLOOD PRESSURE: 120 MMHG | HEIGHT: 66 IN | DIASTOLIC BLOOD PRESSURE: 80 MMHG | BODY MASS INDEX: 25.2 KG/M2 | WEIGHT: 156.8 LBS

## 2021-04-19 DIAGNOSIS — Z12.31 ENCOUNTER FOR SCREENING MAMMOGRAM FOR MALIGNANT NEOPLASM OF BREAST: ICD-10-CM

## 2021-04-19 DIAGNOSIS — Z01.419 ENCOUNTER FOR GYNECOLOGICAL EXAMINATION (GENERAL) (ROUTINE) WITHOUT ABNORMAL FINDINGS: Primary | ICD-10-CM

## 2021-04-19 DIAGNOSIS — N95.0 POSTMENOPAUSAL BLEEDING: ICD-10-CM

## 2021-04-19 PROCEDURE — S0612 ANNUAL GYNECOLOGICAL EXAMINA: HCPCS | Performed by: OBSTETRICS & GYNECOLOGY

## 2021-04-19 NOTE — PROGRESS NOTES
Anjelica Keene   1965    CC:  Yearly exam    S:  54 y o  female here for yearly exam  LMP 10/1/2020 - this was related to use of prednisone, had been ~ 1 year at that point  It was like a normal cycle for her  She denies vaginal discharge, itching, odor or dryness  Sexual activity: She is sexually active without pain, bleeding or dryness  Has noticed a decrease in libido, and some dryness  No significant changes      + LUTHER, worse when jumping  She is friends with Dc Roman and has done some work with exercises when she remembers  Kids are overall doing well  Son is 6, Daughter is a freshman  No significant changes with COVID pandemic - 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[employer])  Last Pap: 18 - Normal Cytology, Neg HPV  Last Mammo: 20 - [facility] 1  Last Colonoscopy: 2015, 10 year recall     We reviewed ASCCP guidelines for Pap testing       Family hx of breast cancer: no  Family hx of ovarian cancer: no  Family hx of colon cancer: no      Current Outpatient Medications:     ibuprofen (MOTRIN) 600 mg tablet, Take by mouth every 6 (six) hours as needed for mild pain, Disp: , Rfl:     levothyroxine 75 mcg tablet, Take 1 tablet (75 mcg total) by mouth daily, Disp: 30 tablet, Rfl: 5    omega-3-acid ethyl esters (LOVAZA) 1 g capsule, Take 2 g by mouth 2 (two) times a day, Disp: , Rfl:   Social History     Socioeconomic History    Marital status: /Civil Union     Spouse name: Not on file    Number of children: Not on file    Years of education: Not on file    Highest education level: Not on file   Occupational History    Not on file   Social Needs    Financial resource strain: Not on file    Food insecurity     Worry: Not on file     Inability: Not on file    Transportation needs     Medical: Not on file     Non-medical: Not on file   Tobacco Use    Smoking status: Former Smoker     Quit date: 1999     Years since quittin 4    Smokeless tobacco: Never Used   Substance and Sexual Activity    Alcohol use: Yes     Frequency: 2-4 times a month     Drinks per session: 1 or 2     Comment: social    Drug use: No    Sexual activity: Yes     Partners: Male     Birth control/protection: Rhythm   Lifestyle    Physical activity     Days per week: Not on file     Minutes per session: Not on file    Stress: Not on file   Relationships    Social connections     Talks on phone: Not on file     Gets together: Not on file     Attends Bahai service: Not on file     Active member of club or organization: Not on file     Attends meetings of clubs or organizations: Not on file     Relationship status: Not on file    Intimate partner violence     Fear of current or ex partner: Not on file     Emotionally abused: Not on file     Physically abused: Not on file     Forced sexual activity: Not on file   Other Topics Concern    Not on file   Social History Narrative    Daily coffee consumption (___Cups/Day)    Exercise Frequency (times/Week)     Family History   Problem Relation Age of Onset    Hypertension Mother    [employer] Lymphoma Mother     No Known Problems Father     Cancer Brother 32        Adrenal    Hypertension Family     Irritable bowel syndrome Family     Cancer Family         Intestinal Cancer    Varicose Veins Family     No Known Problems Sister     No Known Problems Daughter     No Known Problems Paternal Aunt     No Known Problems Paternal Aunt      Past Medical History:   Diagnosis Date    Anxiety     Chest pain     last assessed - 09Apr2015    Disease of thyroid gland     Headache     Hypokalemia     last assessed - 09Apr2015    PONV (postoperative nausea and vomiting)     Shingles 07/2018        Review of Systems   Respiratory: Negative  Cardiovascular: Negative  Gastrointestinal: Negative for constipation and diarrhea     Genitourinary: Negative for difficulty urinating, pelvic pain, vaginal bleeding, vaginal discharge, itching or odor     O:  Blood pressure 120/80, height 5' 5 5" (1 664 m), weight 71 1 kg (156 lb 12 8 oz), last menstrual period 10/01/2020  Patient appears well and is not in distress  Breasts are symmetrical without mass, tenderness, nipple discharge, skin changes or adenopathy  Abdomen is soft and nontender without masses  External genitals are normal without lesions or rashes  Urethral meatus and urethra are normal  Bladder is normal to palpation  Vagina is normal without discharge or bleeding  Cervix is normal without discharge or lesion  Uterus is normal, mobile, nontender without palpable mass  Adnexa are normal, nontender, without palpable mass  A:  Yearly exam, Possible postmenopausal bleeding  P:   Pap up to date  Mammo slip given   Colonoscopy up to date   Will check pelvic US due to episode of bleeding right ~ 1 yr rubia  EMB if indicated based upon results  RTO one year for yearly exam or sooner as needed

## 2021-05-24 DIAGNOSIS — E03.9 HYPOTHYROIDISM, UNSPECIFIED TYPE: ICD-10-CM

## 2021-05-24 RX ORDER — LEVOTHYROXINE SODIUM 0.07 MG/1
TABLET ORAL
Qty: 30 TABLET | Refills: 5 | Status: SHIPPED | OUTPATIENT
Start: 2021-05-24 | End: 2021-11-20

## 2021-06-23 ENCOUNTER — HOSPITAL ENCOUNTER (OUTPATIENT)
Dept: ULTRASOUND IMAGING | Facility: HOSPITAL | Age: 56
Discharge: HOME | End: 2021-06-23
Attending: OBSTETRICS & GYNECOLOGY
Payer: COMMERCIAL

## 2021-06-23 DIAGNOSIS — N95.0 POSTMENOPAUSAL BLEEDING: ICD-10-CM

## 2021-06-23 PROCEDURE — 76830 TRANSVAGINAL US NON-OB: CPT

## 2021-06-23 PROCEDURE — 76856 US EXAM PELVIC COMPLETE: CPT

## 2021-06-30 ENCOUNTER — TELEPHONE (OUTPATIENT)
Dept: OBGYN CLINIC | Facility: CLINIC | Age: 56
End: 2021-06-30

## 2021-08-23 ENCOUNTER — HOSPITAL ENCOUNTER (OUTPATIENT)
Dept: MAMMOGRAPHY | Facility: IMAGING CENTER | Age: 56
Discharge: HOME | End: 2021-08-23
Payer: COMMERCIAL

## 2021-08-23 VITALS — HEIGHT: 66 IN | BODY MASS INDEX: 26.03 KG/M2 | WEIGHT: 162 LBS

## 2021-08-23 DIAGNOSIS — Z12.31 ENCOUNTER FOR SCREENING MAMMOGRAM FOR MALIGNANT NEOPLASM OF BREAST: ICD-10-CM

## 2021-08-23 PROCEDURE — 77067 SCR MAMMO BI INCL CAD: CPT

## 2021-08-23 PROCEDURE — 77063 BREAST TOMOSYNTHESIS BI: CPT

## 2021-11-22 ENCOUNTER — OFFICE VISIT (OUTPATIENT)
Dept: FAMILY MEDICINE CLINIC | Facility: CLINIC | Age: 56
End: 2021-11-22
Payer: COMMERCIAL

## 2021-11-22 VITALS
BODY MASS INDEX: 26.9 KG/M2 | RESPIRATION RATE: 13 BRPM | DIASTOLIC BLOOD PRESSURE: 80 MMHG | SYSTOLIC BLOOD PRESSURE: 120 MMHG | HEART RATE: 70 BPM | HEIGHT: 66 IN | OXYGEN SATURATION: 98 % | TEMPERATURE: 97.6 F | WEIGHT: 167.4 LBS

## 2021-11-22 DIAGNOSIS — E89.0 POST-OPERATIVE HYPOTHYROIDISM: ICD-10-CM

## 2021-11-22 DIAGNOSIS — Z13.6 ENCOUNTER FOR LIPID SCREENING FOR CARDIOVASCULAR DISEASE: ICD-10-CM

## 2021-11-22 DIAGNOSIS — Z00.00 ANNUAL PHYSICAL EXAM: Primary | ICD-10-CM

## 2021-11-22 DIAGNOSIS — Z23 ENCOUNTER FOR IMMUNIZATION: ICD-10-CM

## 2021-11-22 DIAGNOSIS — Z13.1 SCREENING FOR DIABETES MELLITUS: ICD-10-CM

## 2021-11-22 DIAGNOSIS — Z13.220 ENCOUNTER FOR LIPID SCREENING FOR CARDIOVASCULAR DISEASE: ICD-10-CM

## 2021-11-22 PROBLEM — J34.89 SINUS PRESSURE: Status: RESOLVED | Noted: 2020-11-24 | Resolved: 2021-11-22

## 2021-11-22 PROCEDURE — 1036F TOBACCO NON-USER: CPT | Performed by: FAMILY MEDICINE

## 2021-11-22 PROCEDURE — 90471 IMMUNIZATION ADMIN: CPT

## 2021-11-22 PROCEDURE — 90682 RIV4 VACC RECOMBINANT DNA IM: CPT

## 2021-11-22 PROCEDURE — 3725F SCREEN DEPRESSION PERFORMED: CPT | Performed by: FAMILY MEDICINE

## 2021-11-22 PROCEDURE — 3008F BODY MASS INDEX DOCD: CPT | Performed by: FAMILY MEDICINE

## 2021-11-22 PROCEDURE — 99396 PREV VISIT EST AGE 40-64: CPT | Performed by: FAMILY MEDICINE

## 2021-12-17 DIAGNOSIS — E03.9 HYPOTHYROIDISM, UNSPECIFIED TYPE: ICD-10-CM

## 2021-12-17 RX ORDER — LEVOTHYROXINE SODIUM 0.07 MG/1
TABLET ORAL
Qty: 30 TABLET | Refills: 0 | Status: SHIPPED | OUTPATIENT
Start: 2021-12-17 | End: 2022-01-15

## 2021-12-20 ENCOUNTER — IMMUNIZATIONS (OUTPATIENT)
Dept: FAMILY MEDICINE CLINIC | Facility: HOSPITAL | Age: 56
End: 2021-12-20

## 2021-12-20 DIAGNOSIS — Z23 ENCOUNTER FOR IMMUNIZATION: Primary | ICD-10-CM

## 2021-12-20 PROCEDURE — 91300 COVID-19 PFIZER VACC 0.3 ML: CPT

## 2021-12-20 PROCEDURE — 0001A COVID-19 PFIZER VACC 0.3 ML: CPT

## 2022-01-10 ENCOUNTER — OFFICE VISIT (OUTPATIENT)
Dept: PHYSICAL THERAPY | Facility: REHABILITATION | Age: 57
End: 2022-01-10
Payer: COMMERCIAL

## 2022-01-10 DIAGNOSIS — M54.2 NECK PAIN: ICD-10-CM

## 2022-01-10 DIAGNOSIS — M26.609 TEMPORAL MANDIBULAR JOINT DISORDER: Primary | ICD-10-CM

## 2022-01-10 PROCEDURE — 97112 NEUROMUSCULAR REEDUCATION: CPT | Performed by: PHYSICAL THERAPIST

## 2022-01-10 PROCEDURE — 97140 MANUAL THERAPY 1/> REGIONS: CPT | Performed by: PHYSICAL THERAPIST

## 2022-01-10 PROCEDURE — 97162 PT EVAL MOD COMPLEX 30 MIN: CPT | Performed by: PHYSICAL THERAPIST

## 2022-01-10 NOTE — PROGRESS NOTES
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        Start Time: 1010  Stop Time: 1115  Total time in clinic (min): 65 minutes    Assessment  Assessment details: Janeen Spencer is a 64 y o  female presenting to outpatient physical therapy on 01/10/22 with referral from [address] for left sided nec pain, TMJD and headaches  MSIS consistent with CS ERS and consistent with neck pain with upper CS mobility deficits  Upon evaluation, Martinez Henley demonstrates impaired left masseter tone, upper CS mobility deficits with S curve when performing jaw opening, suggesting motor control impairments at TMJ   The listed impairments and functional limitation are effecting Martinez Henley ability to function at prior level   They can continue to benefit from physical therapy services at this times in order to address the above discussed impairments and functional limitation in order to allow for a return to premorbid status     HEP: supine DNF, masseter isometrics, controlled opening  Impairments: abnormal muscle firing, abnormal muscle tone, abnormal or restricted ROM, abnormal movement, activity intolerance, impaired physical strength and pain with function  Understanding of Dx/Px/POC: good   Prognosis: good    Plan  Patient would benefit from: skilled PT  Planned modality interventions: thermotherapy: hydrocollator packs  Planned therapy interventions: joint mobilization, manual therapy, ADL training, neuromuscular re-education, home exercise program, therapeutic exercise, therapeutic activities, strengthening, patient education and functional ROM exercises  Frequency: 2x week  Duration in weeks: 4  Plan of Care beginning date: 1/10/2022  Plan of Care expiration date: 2022  Treatment plan discussed with: patient        Subjective Evaluation    History of Present Illness  Mechanism of injury: Susanna Singh is a 64 y o  female presenting to physical therapy on 01/10/22 with referral from [facility] for  Left sided TMJD, neck pain and headaches  She states that since her 20's she has clenched her teeth  Feels that anxiety will increase her symptoms  Does wear a   She has a history of migraines which are different from the headaches she gets now  Headaches at this time are related to jaw stiffness and pain and often times are concomitant  Denies any pain into top or bottom row of teeth  Notes pain with opening and CC of ear fullness, L sided jaw pain and stiffness  First weekend in September states that she was singing in choir and the next day her pain began  Worse in morning that would improve throughout the day  Ear fullness with referal into neck  Will at times experience tinnitus, only in left ear  Pain has since decreased in severity but she still does feel a sensation of ear fullness  Has not been treated by a PT in the past for TMJD  For work she does sit a lit throughout the day, admits to likely not having a great ergonomic set up  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  Pain  Current pain ratin  At worst pain ratin  Location: left jaw/tmj      Diagnostic Tests  No diagnostic tests performed  Treatments  No previous or current treatments  Patient Goals  Patient goals for therapy: decreased pain  Patient goal: chew pain free, self managment of symptoms, reduced symptoms of ear fullness     Short Term Goals:   1  Patient will be Independent with hep  2  Patient will improve pain with activity by 50%  3  Patient will have normalized jaw opening without S curve      Long Term Goals:   1  Patient will improve FOTO to greater then goal  2  Patient will improve pain with activity to 1/10 or less  3   Patient will continue with HEP independence to allow for decreased future reoccurrence of pain and loss in function  4  Patient will report pain free chewing  5  Patient will report reduction in headaches to 1 day/week or less          Objective    Posture: mild forward head posture    Scapular position:  Abduction Syndrome:   b/l        Palpation: TTP L SCM, L masseter     Cervical  % of normal   Flex  76   Extn   100   SB Left 75   SB Right 75   ROT Left 75   ROT Right 75     Jaw:   Openin mm - S curve noted  Lateral deviation: 15 mm L, 12 mm R     Supine DNF endurance: 20", R SB and R rot noted         Segmental mobility:   OAJ=   normal  AAJ=   normal  Mid CS=  Hypomobile C2-3 into L side glide             Precautions:       Manuals 1/10            C2-3 MET to L side glide 2 bouts            STM L SCM AB                                      Neuro Re-Ed             Jaw flushing/opening 6x            Supine DNF 5"x5            L biting exercise 5"x10            LT lifts with postural correction                                                    Ther Ex                                                                                                                     Ther Activity                                       Gait Training                                       Modalities

## 2022-01-12 ENCOUNTER — OFFICE VISIT (OUTPATIENT)
Dept: PHYSICAL THERAPY | Facility: REHABILITATION | Age: 57
End: 2022-01-12
Payer: COMMERCIAL

## 2022-01-12 DIAGNOSIS — M26.609 TEMPORAL MANDIBULAR JOINT DISORDER: Primary | ICD-10-CM

## 2022-01-12 DIAGNOSIS — M54.2 NECK PAIN: ICD-10-CM

## 2022-01-12 PROCEDURE — 97112 NEUROMUSCULAR REEDUCATION: CPT | Performed by: PHYSICAL THERAPIST

## 2022-01-12 PROCEDURE — 97140 MANUAL THERAPY 1/> REGIONS: CPT | Performed by: PHYSICAL THERAPIST

## 2022-01-12 NOTE — PROGRESS NOTES
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    2  Neck pain  M54 2        Start Time: 1005  Stop Time: 1050  Total time in clinic (min): 45 minutes    Subjective: Patient reports feeling less fullness in jaw and ear but states that pain is more point today (3-4/10 pain)  Objective: See treatment diary below  TTP L lateral pterygoid    Assessment: Tolerated treatment well  Patient did have improved control with DNF today as she had less compensatory SB and rotation  By end of session she had normalized opening with good roll to glide in TMJ  Discussed importance of ergonomic set up  Plan: Continue per plan of care        Precautions:       Manuals 1/10 1/12           C2-3 MET to L side glide 2 bouts 2 bouts           STM L SCM AB AB           L side glide C2-3 G4 with R CS SB  AB           STM L lateral pterygoid  3 bouts           Neuro Re-Ed             Jaw flushing/opening 6x 10x           Supine DNF 5"x5 5"x10           L biting exercise 5"x10 5"x10 (3 sticks)           LT lifts with postural correction             Education time  5'                                     Ther Ex                                                                                                                     Ther Activity                                       Gait Training                                       Modalities Never

## 2022-01-15 DIAGNOSIS — E03.9 HYPOTHYROIDISM, UNSPECIFIED TYPE: ICD-10-CM

## 2022-01-15 RX ORDER — LEVOTHYROXINE SODIUM 0.07 MG/1
TABLET ORAL
Qty: 30 TABLET | Refills: 0 | Status: SHIPPED | OUTPATIENT
Start: 2022-01-15 | End: 2022-02-13

## 2022-01-17 ENCOUNTER — OFFICE VISIT (OUTPATIENT)
Dept: PHYSICAL THERAPY | Facility: REHABILITATION | Age: 57
End: 2022-01-17
Payer: COMMERCIAL

## 2022-01-17 DIAGNOSIS — M54.2 NECK PAIN: Primary | ICD-10-CM

## 2022-01-17 DIAGNOSIS — M26.609 TEMPORAL MANDIBULAR JOINT DISORDER: ICD-10-CM

## 2022-01-17 PROCEDURE — 97112 NEUROMUSCULAR REEDUCATION: CPT | Performed by: PHYSICAL THERAPIST

## 2022-01-17 PROCEDURE — 97140 MANUAL THERAPY 1/> REGIONS: CPT | Performed by: PHYSICAL THERAPIST

## 2022-01-17 NOTE — PROGRESS NOTES
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        Start Time: 1000  Stop Time: 1045  Total time in clinic (min): 45 minutes    Subjective: Patient reports that she did not get a chance to do her exercises over the weekend  No complaints at this time  Objective: See treatment diary below  + S curve - deviation to L first  TTP b/l lateral pterygoid    Assessment: Tolerated treatment well  Patient reported less pain/pulling in jaw with opening after lateral R deviation isometric  She did fatigue in L side masseter and pterygoid by end of session  Improving C2-3 mobility as well as contorl with supine DNF      Plan: Continue per plan of care        Precautions:       Manuals 1/10 1/12 1/17          C2-3 MET to L side glide 2 bouts 2 bouts           STM L SCM AB AB NP          Laser - L masseter    2'          L side glide C2-3 G4 with R CS SB  AB AB          STM L lateral pterygoid  3 bouts 2 bouts ea side          Neuro Re-Ed            Jaw flushing/opening 6x 10x 5x          Supine DNF 5"x5 5"x10 8"  2x5          L biting exercise 5"x10 5"x10 (3 sticks) 5"x10 3 sticks          LT lifts with postural correction             Education time  5' 2'          Lateral deviation isometric to R - 50% and 75% opening   5"  3x  3 rds                       Ther Ex                                                                                                                     Ther Activity                                       Gait Training                                       Modalities

## 2022-01-19 ENCOUNTER — OFFICE VISIT (OUTPATIENT)
Dept: PHYSICAL THERAPY | Facility: REHABILITATION | Age: 57
End: 2022-01-19
Payer: COMMERCIAL

## 2022-01-19 DIAGNOSIS — M26.609 TEMPORAL MANDIBULAR JOINT DISORDER: Primary | ICD-10-CM

## 2022-01-19 DIAGNOSIS — M54.2 NECK PAIN: ICD-10-CM

## 2022-01-19 PROCEDURE — 97140 MANUAL THERAPY 1/> REGIONS: CPT | Performed by: PHYSICAL THERAPIST

## 2022-01-19 PROCEDURE — 97112 NEUROMUSCULAR REEDUCATION: CPT | Performed by: PHYSICAL THERAPIST

## 2022-01-19 NOTE — PROGRESS NOTES
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    2  Neck pain  M54 2        Start Time: 945  Stop Time: 1030  Total time in clinic (min): 45 minutes    Subjective:       Objective: See treatment diary below  Normal opening without C or S curve noted  Hypomobile rib 1 inferior glide - improved post mob    Assessment: Tolerated treatment well  Updated HEP with scapular strengthening as it role it plays with posture, increased strain on upper CS  Plan: Continue per plan of care        Precautions:       Manuals 1/10 1/12 1/17 1/19         C2-3 MET to L side glide 2 bouts 2 bouts  4 rds         STM L SCM AB AB NP AB         Laser - L masseter    2' 2'         L side glide C2-3 G4 with R CS SB  AB AB          STM L lateral pterygoid  3 bouts 2 bouts ea side          Neuro Re-Ed           Jaw flushing/opening 6x 10x 5x          Supine DNF 5"x5 5"x10 8"  2x5 NP         L biting exercise 5"x10 5"x10 (3 sticks) 5"x10 3 sticks NP         LT lifts with postural correction             Education time  5' 2' 3'         Lt lifts GTB   5"  3x  3 rds 5"x5 75% opening  5"x5 near full opening             10"x10         Ther Ex                                                                                                                     Ther Activity                                       Gait Training                                       Modalities

## 2022-01-24 ENCOUNTER — OFFICE VISIT (OUTPATIENT)
Dept: PHYSICAL THERAPY | Facility: REHABILITATION | Age: 57
End: 2022-01-24
Payer: COMMERCIAL

## 2022-01-24 DIAGNOSIS — M26.609 TEMPORAL MANDIBULAR JOINT DISORDER: Primary | ICD-10-CM

## 2022-01-24 DIAGNOSIS — M54.2 NECK PAIN: ICD-10-CM

## 2022-01-24 PROCEDURE — 97140 MANUAL THERAPY 1/> REGIONS: CPT | Performed by: PHYSICAL THERAPIST

## 2022-01-24 PROCEDURE — 97112 NEUROMUSCULAR REEDUCATION: CPT | Performed by: PHYSICAL THERAPIST

## 2022-01-24 NOTE — PROGRESS NOTES
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    2  Neck pain  M54 2        Start Time: 1005  Stop Time: 1050  Total time in clinic (min): 45 minutes    Subjective: Patient reports that she ended up sitting at a wrestling match this weekend with not the best posture and began to experience more L sided jaw pain  Objective: See treatment diary below  Mild TTP along L masseter  Normal pterygoid tone L vs R  Hypomobile L TMJ with decreased glide, L C curve      Assessment: Tolerated treatment well  Patient with improved jaw mechanics post re-ed  She did have decreased C2-3 mobility today into left side glide as well which improved with MET and joint mobs  Spent time educating patient on what to look for if she does have increased jaw pain in regards to movement and how to correct with exercise  Plan: Continue per plan of care        Precautions:       Manuals 1/10 1/12 1/17 1/19 1/24        C2-3 MET to L side glide 2 bouts 2 bouts  4 rds 2 rds        STM L SCM AB AB NP AB         Laser - L masseter    2' 2' AB        L side glide C2-3 G4 with R CS SB  AB AB          STM L masseter     AB        STM L lateral pterygoid  3 bouts 2 bouts ea side          Neuro Re-Ed          Jaw flushing/opening 6x 10x 5x          Supine DNF 5"x5 5"x10 8"  2x5 NP         L biting exercise 5"x10 5"x10 (3 sticks) 5"x10 3 sticks NP 5"x5        LT lifts with postural correction     10"x10        Education time  5' 2' 3' 3'           5"  3x  3 rds 5"x5 75% opening  5"x5 near full opening 5"x5  75% opening  2 sets            10"x10         Ther Ex                                                                                                                     Ther Activity                                       Gait Training Modalities

## 2022-01-26 ENCOUNTER — OFFICE VISIT (OUTPATIENT)
Dept: PHYSICAL THERAPY | Facility: REHABILITATION | Age: 57
End: 2022-01-26
Payer: COMMERCIAL

## 2022-01-26 DIAGNOSIS — M26.609 TEMPORAL MANDIBULAR JOINT DISORDER: Primary | ICD-10-CM

## 2022-01-26 DIAGNOSIS — M54.2 NECK PAIN: ICD-10-CM

## 2022-01-26 PROCEDURE — 97140 MANUAL THERAPY 1/> REGIONS: CPT | Performed by: PHYSICAL THERAPIST

## 2022-01-26 PROCEDURE — 97112 NEUROMUSCULAR REEDUCATION: CPT | Performed by: PHYSICAL THERAPIST

## 2022-01-26 NOTE — PROGRESS NOTES
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    2  Neck pain  M54 2        Start Time: 1100  Stop Time: 1145  Total time in clinic (min): 45 minutes    Subjective: Patient reports that her jaw has been doing good  She has some increased neck pain today due to how she slepf      Objective: See treatment diary below  Hypomobile C2-3 L side glide  Hypomobile C4-5 into R side glide  CS cleared - Denies recent h/a, facial numbness, 5 Ds, 3 Ns  Intact alar and transverse lig    Assessment: Tolerated treatment well  Patient with normalized glide and roll with jaw opening  She had no C or S curve noted today  See noted hypomobility above as she tolerated mobilization well  Good control with overhead lifting, no CS deviation  Plan: Continue per plan of care        Precautions:       Manuals 1/10 1/12 1/17 1/19 1/24 1/26       C2-3 MET to L side glide 2 bouts 2 bouts  4 rds 2 rds G5 mob       STM L SCM AB AB NP AB  AB       Laser - L masseter    2' 2' AB 2'       C4-5 R rot mob      G5 to R       L side glide C2-3 G4 with R CS SB  AB AB          STM L masseter     AB        STM L lateral pterygoid  3 bouts 2 bouts ea side          Neuro Re-Ed         Jaw flushing/opening 6x 10x 5x          Supine DNF 5"x5 5"x10 8"  2x5 NP         L biting exercise 5"x10 5"x10 (3 sticks) 5"x10 3 sticks NP 5"x5        LT lifts with postural correction     10"x10        Education time  5' 2' 3' 3' 3'          5"  3x  3 rds 5"x5 75% opening  5"x5 near full opening 5"x5  75% opening  2 sets 8"x5  2 sets @ 75% opening       Overhead flexion with CS control    10"x10 OTB  2x10 ea side OTB  2x10 ea side       Ther Ex Ther Activity                                       Gait Training                                       Modalities

## 2022-01-31 ENCOUNTER — OFFICE VISIT (OUTPATIENT)
Dept: PHYSICAL THERAPY | Facility: REHABILITATION | Age: 57
End: 2022-01-31
Payer: COMMERCIAL

## 2022-01-31 DIAGNOSIS — M54.2 NECK PAIN: Primary | ICD-10-CM

## 2022-01-31 DIAGNOSIS — M26.609 TEMPORAL MANDIBULAR JOINT DISORDER: ICD-10-CM

## 2022-01-31 PROCEDURE — 97112 NEUROMUSCULAR REEDUCATION: CPT | Performed by: PHYSICAL THERAPIST

## 2022-01-31 PROCEDURE — 97140 MANUAL THERAPY 1/> REGIONS: CPT | Performed by: PHYSICAL THERAPIST

## 2022-01-31 NOTE — PROGRESS NOTES
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        Start Time: 1005  Stop Time: 1050  Total time in clinic (min): 45 minutes    Subjective: Patient reports that she was not as consistent with her HEP, feels as though some life stressors may have contributed to soreness today  C/O crunching in her jaw today when opening  Objective: See treatment diary below  Opening: normal, no C or S curve noted  Self reports crunching  hypomobile L OAJ  Normal C2-3 mobility, normalized C4-5 mobility from last visit    Assessment: Tolerated treatment well  Patient educated to continue with masseter and L pterygoid re-ed  Tolerated OAJ mob well  CS cleared prior to G5 mobilization  WIll likely DC NV with 30 day DA to   Plan: Continue per plan of care        Precautions:       Manuals 1/10 1/12 1/17 1/19 1/24 1/26 1/31      C2-3 MET to L side glide 2 bouts 2 bouts  4 rds 2 rds G5 mob       STM L SCM AB AB NP AB  AB       Laser - L masseter    2' 2' AB 2' 2'      C4-5 R rot mob      G5 to R       SOR - R       AB: static, 5x with L rot and flex      L side glide C2-3 G4 with R CS SB  AB AB          L OAJ distraction       G5      STM L masseter     AB  AB      STM L lateral pterygoid  3 bouts 2 bouts ea side    assessed      Neuro Re-Ed        Jaw flushing/opening 6x 10x 5x          Supine DNF 5"x5 5"x10 8"  2x5 NP         L biting exercise 5"x10 5"x10 (3 sticks) 5"x10 3 sticks NP 5"x5  8"x10      LT lifts with postural correction     10"x10        Education time  5' 2' 3' 3' 3' 5'         5"  3x  3 rds 5"x5 75% opening  5"x5 near full opening 5"x5  75% opening  2 sets 8"x5  2 sets @ 75% opening       Overhead flexion with CS control    10"x10 OTB  2x10 ea side OTB  2x10 ea side Ther Ex                                                                                                                     Ther Activity                                       Gait Training                                       Modalities

## 2022-02-02 ENCOUNTER — OFFICE VISIT (OUTPATIENT)
Dept: PHYSICAL THERAPY | Facility: REHABILITATION | Age: 57
End: 2022-02-02
Payer: COMMERCIAL

## 2022-02-02 DIAGNOSIS — M26.609 TEMPORAL MANDIBULAR JOINT DISORDER: Primary | ICD-10-CM

## 2022-02-02 DIAGNOSIS — M54.2 NECK PAIN: ICD-10-CM

## 2022-02-02 PROCEDURE — 97140 MANUAL THERAPY 1/> REGIONS: CPT | Performed by: PHYSICAL THERAPIST

## 2022-02-02 PROCEDURE — 97112 NEUROMUSCULAR REEDUCATION: CPT | Performed by: PHYSICAL THERAPIST

## 2022-02-02 NOTE — PROGRESS NOTES
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    2  Neck pain  M54 2        Start Time: 1000  Stop Time: 1045  Total time in clinic (min): 45 minutes    Assessment  Assessment details: Patient is a 64y o  year old female who attended physical therapy for 8 treatment sessions regarding left sided TMJD  Patient reports 50% improvement at this time which correlates to improved impairments and functionality  She reports less left sided jaw, head and neck pain throughout the day as well as improved pain with chewing  States that she recently had a bagel and what would have normally given her pain, was pain free  Patient has shown improvement throughout PT by demonstrating decreased pain, increased range of motion, increased strength and improved tolerance to activity  She has progressed well thus far, will continue to benefit from PT next week to continue to improve upon gain made in order to decrease chances for return to PT in future     Impairments: abnormal muscle firing, abnormal muscle tone, abnormal or restricted ROM, abnormal movement, activity intolerance, impaired physical strength and pain with function  Understanding of Dx/Px/POC: good   Prognosis: good    Plan  Patient would benefit from: skilled PT  Planned therapy interventions: joint mobilization, manual therapy, neuromuscular re-education, home exercise program, therapeutic exercise, therapeutic activities, strengthening, patient education and functional ROM exercises  Frequency: 2x week  Duration in weeks: 4  Plan of Care beginning date: 1/10/2022  Plan of Care expiration date: 2022  Treatment plan discussed with: patient        Subjective Evaluation    History of Present Illness  Mechanism of injury: Evaluation:  Nestor Wooten is a 64 y o  female presenting to physical therapy on 01/10/22 with referral from [facility] for  Left sided TMJD, neck pain and headaches  She states that since her 20's she has clenched her teeth  Feels that anxiety will increase her symptoms  Does wear a   She has a history of migraines which are different from the headaches she gets now  Headaches at this time are related to jaw stiffness and pain and often times are concomitant  Denies any pain into top or bottom row of teeth  Notes pain with opening and CC of ear fullness, L sided jaw pain and stiffness  First weekend in September states that she was singing in choir and the next day her pain began  Worse in morning that would improve throughout the day  Ear fullness with referal into neck  Will at times experience tinnitus, only in left ear  Pain has since decreased in severity but she still does feel a sensation of ear fullness  Has not been treated by a PT in the past for TMJD  For work she does sit a lit throughout the day, admits to likely not having a great ergonomic set up  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  Pain  Current pain ratin  At worst pain ratin  Location: left jaw/tmj      Diagnostic Tests  No diagnostic tests performed  Treatments  No previous or current treatments  Patient Goals  Patient goals for therapy: decreased pain  Patient goal: chew pain free, self managment of symptoms, reduced symptoms of ear fullness     Short Term Goals:   1  Patient will be Independent with hep - MET  2  Patient will improve pain with activity by 50% - MET  3  Patient will have normalized jaw opening without S curve - IMPROVED       Long Term Goals:   1  Patient will improve FOTO to greater then goal - MET  2  Patient will improve pain with activity to 1/10 or less  3  Patient will continue with HEP independence to allow for decreased future reoccurrence of pain and loss in function - MET  4   Patient will report pain free chewing   5  Patient will report reduction in headaches to 1 day/week or less          Objective    Posture: mild forward head posture    Scapular position:  Abduction Syndrome:   b/l        Palpation: TTP L SCM, L masseter (RESOLVED)     Cervical  % of normal   Flex  76   Extn   100   SB Left 75   SB Right 75   ROT Left 75   ROT Right 75     Jaw:   Openin mm - no curve   Lateral deviation: 15 mm L, 15 mm R     Supine DNF endurance: 20", R SB and R rot noted (IMPROVED COMPENSATION)         Segmental mobility:   OAJ=   normal  AAJ=   normal  Mid CS= normal              Precautions:     Manuals 1/10 1/12 1/17 1/19 1/24 1/26 1/31 2/2     assessment        15'     C1 R lateral glide        AB     C2-3 MET to L side glide 2 bouts 2 bouts  4 rds 2 rds G5 mob       STM L SCM AB AB NP AB  AB  AB     Laser - L masseter    2' 2' AB 2' 2'      C4-5 R rot mob      G5 to R       SOR - R       AB: static, 5x with L rot and flex AB     L side glide C2-3 G4 with R CS SB  AB AB          L OAJ distraction       G5      STM L masseter     AB  AB      STM L lateral pterygoid  3 bouts 2 bouts ea side    assessed      Neuro Re-Ed   2/2     Jaw flushing/opening 6x 10x 5x          Supine DNF 5"x5 5"x10 8"  2x5 NP    5"x10     L biting exercise 5"x10 5"x10 (3 sticks) 5"x10 3 sticks NP 5"x5  8"x10      LT lifts with postural correction     10"x10        Education time  5' 2' 3' 3' 3' 5' 5'        5"  3x  3 rds 5"x5 75% opening  5"x5 near full opening 5"x5  75% opening  2 sets 8"x5  2 sets @ 75% opening       Overhead flexion with CS control    10"x10 OTB  2x10 ea side OTB  2x10 ea side       Ther Ex                                                                                                                     Ther Activity                                       Gait Training                                       Modalities

## 2022-02-07 ENCOUNTER — OFFICE VISIT (OUTPATIENT)
Dept: PHYSICAL THERAPY | Facility: REHABILITATION | Age: 57
End: 2022-02-07
Payer: COMMERCIAL

## 2022-02-07 DIAGNOSIS — M26.609 TEMPORAL MANDIBULAR JOINT DISORDER: ICD-10-CM

## 2022-02-07 DIAGNOSIS — M54.2 NECK PAIN: Primary | ICD-10-CM

## 2022-02-07 PROCEDURE — 97140 MANUAL THERAPY 1/> REGIONS: CPT | Performed by: PHYSICAL THERAPIST

## 2022-02-07 PROCEDURE — 97112 NEUROMUSCULAR REEDUCATION: CPT | Performed by: PHYSICAL THERAPIST

## 2022-02-07 NOTE — PROGRESS NOTES
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        Start Time: 1005  Stop Time: 1050  Total time in clinic (min): 45 minutes    Subjective: Patient reports that she had been doing well, stiffness reported today after singing last night in choir  Objective: See treatment diary below  Hypomobile C1-2 R rotation  Hypomobile C2-3 L side glide  Reports less jaw tightness post CS mob  CS cleared - intact alar,transverse ligaments  End range rot asymptomatic as well as pre manipulative hold  Assessment: Tolerated treatment well  Patient with good response to C1-2 and C2-3 mobilzation  She had less jaw pain post  Focuses on CS stability with rotation re-ed to upper CS as well as UT wall slides due to a positive shoulder girdle elevation test        Plan: Continue per plan of care        Precautions:     Manuals 1/10 1/12 1/17 1/19 1/24 1/26 1/31 2    assessment        15' 5'    C1 R lateral glide        AB     C2-3 MET to L side glide 2 bouts 2 bouts  4 rds 2 rds G5 mob   G5 mob    STM L SCM AB AB NP AB  AB  AB     Laser - L masseter    2' 2' AB 2' 2'      C4-5 R rot mob      G5 to R       SOR - R       AB: static, 5x with L rot and flex AB     L side glide C2-3 G4 with R CS SB  AB AB          L OAJ distraction       G5      C1-2 R rot mob         G5    STM L lateral pterygoid  3 bouts 2 bouts ea side    assessed      Neuro Re-Ed   2/ 2    Jaw flushing/opening 6x 10x 5x          Supine DNF 5"x5 5"x10 8"  2x5 NP    5"x10 8"x10    CS seated rot isometric         10"x5 ea side    Seated CS rot - arm elevated         10x ea side    L biting exercise 5"x10 5"x10 (3 sticks) 5"x10 3 sticks NP 5"x5  8"x10      LT lifts with postural correction     10"x10        Education time 5' 2' 3' 3' 3' 5' 5'        5"  3x  3 rds 5"x5 75% opening  5"x5 near full opening 5"x5  75% opening  2 sets 8"x5  2 sets @ 75% opening       UT wall slide         5"x5  2 sets    Overhead flexion with CS control    10"x10 OTB  2x10 ea side OTB  2x10 ea side       Ther Ex                                                                                                                     Ther Activity                                       Gait Training                                       Modalities

## 2022-02-09 ENCOUNTER — OFFICE VISIT (OUTPATIENT)
Dept: PHYSICAL THERAPY | Facility: REHABILITATION | Age: 57
End: 2022-02-09
Payer: COMMERCIAL

## 2022-02-09 DIAGNOSIS — M26.609 TEMPORAL MANDIBULAR JOINT DISORDER: ICD-10-CM

## 2022-02-09 DIAGNOSIS — M54.2 NECK PAIN: Primary | ICD-10-CM

## 2022-02-09 PROCEDURE — 97112 NEUROMUSCULAR REEDUCATION: CPT | Performed by: PHYSICAL THERAPIST

## 2022-02-09 PROCEDURE — 97140 MANUAL THERAPY 1/> REGIONS: CPT | Performed by: PHYSICAL THERAPIST

## 2022-02-09 NOTE — PROGRESS NOTES
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        Start Time: 1005  Stop Time: 1050  Total time in clinic (min): 45 minutes    Subjective: Patient reports doing well, has not had any pain or discomfort since last session  Objective: See treatment diary below  Normal jaw opening  Normal C1-2, C2-3 mobility vs last session    Assessment: Tolerated treatment well  Patient with CS left lateral deviation when performing standing extensor endurance   Difficulty engaging SA without UT activation with shoulder elevation (L side)  NV will work on patient improving motor control with shoulder elevation with increases in UT compensation  Plan: Continue per plan of care        Precautions:     Manuals 1/10 1/12 1/17 1/19 1/24 1/26 1/31 2/2 2/7 2/9   assessment        15' 5' 5'   C1 R lateral glide        AB     C2-3 MET to L side glide 2 bouts 2 bouts  4 rds 2 rds G5 mob   G5 mob    STM L SCM AB AB NP AB  AB  AB     Laser - L masseter    2' 2' AB 2' 2'      C4-5 R rot mob      G5 to R       SOR - R       AB: static, 5x with L rot and flex AB     R side glide C4-5   AB AB       G4   L OAJ distraction       G5      C1-2 R rot mob         G5    STM L lateral pterygoid  3 bouts 2 bouts ea side    assessed      Neuro Re-Ed   2   Jaw flushing/opening 6x 10x 5x          Supine DNF 5"x5 5"x10 8"  2x5 NP    5"x10 8"x10 8"x10   CS seated rot isometric         10"x5 ea side    Seated CS rot - arm elevated         10x ea side    L biting exercise 5"x10 5"x10 (3 sticks) 5"x10 3 sticks NP 5"x5  8"x10      LT lifts with postural correction     10"x10        Education time  5' 2' 3' 3' 3' 5' 5'        5"  3x  3 rds 5"x5 75% opening  5"x5 near full opening 5"x5  75% opening  2 sets 8"x5  2 sets @ 75% opening       UT wall slide         5"x5  2 sets    Wall slide - SA          10x, 10x in side lying   Neck ext training           5"x5  2x10 arm lifts   Overhead flexion with CS control    10"x10 OTB  2x10 ea side OTB  2x10 ea side       Ther Ex                                                                                                                     Ther Activity                                       Gait Training                                       Modalities

## 2022-02-13 DIAGNOSIS — E03.9 HYPOTHYROIDISM, UNSPECIFIED TYPE: ICD-10-CM

## 2022-02-13 RX ORDER — LEVOTHYROXINE SODIUM 0.07 MG/1
TABLET ORAL
Qty: 30 TABLET | Refills: 0 | Status: SHIPPED | OUTPATIENT
Start: 2022-02-13 | End: 2022-03-12

## 2022-02-23 ENCOUNTER — OFFICE VISIT (OUTPATIENT)
Dept: PHYSICAL THERAPY | Facility: REHABILITATION | Age: 57
End: 2022-02-23
Payer: COMMERCIAL

## 2022-02-23 DIAGNOSIS — M54.2 NECK PAIN: Primary | ICD-10-CM

## 2022-02-23 DIAGNOSIS — M26.609 TEMPORAL MANDIBULAR JOINT DISORDER: ICD-10-CM

## 2022-02-23 PROCEDURE — 97140 MANUAL THERAPY 1/> REGIONS: CPT | Performed by: PHYSICAL THERAPIST

## 2022-02-23 PROCEDURE — 97112 NEUROMUSCULAR REEDUCATION: CPT | Performed by: PHYSICAL THERAPIST

## 2022-02-23 NOTE — PROGRESS NOTES
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        Start Time: 935  Stop Time: 1020  Total time in clinic (min): 45 minutes    Subjective: Patient reports doing well, did a lot of sitting at wrestling matches the last 2 weeks, no flare up reported, no headaches reported  Objective: See treatment diary below  Opening 50 mm  Normal opening pattern  Normal CS segmental mobility    Assessment: Tolerated treatment well  Patient has difficulty performing MT strengthening witohut UT engagement  She was able to best perform MT engagement with BL ER + lift off  Updated for HEP  Plan: Continue per plan of care        Precautions:     Manuals    assessment 5'       15' 5' 5'   C1 R lateral glide        AB     C2-3 MET to L side glide      G5 mob   G5 mob    STM L SCM      AB  AB     Laser - L masseter       2' 2'      C4-5 R rot mob      G5 to R       STM L SCM AB            SOR - R       AB: static, 5x with L rot and flex AB     R side glide C4-5           G4   L OAJ distraction       G5      C1-2 R rot mob         G5    STM L lateral pterygoid       assessed      Neuro Re-Ed    Jaw flushing/opening             Supine DNF        5"x10 8"x10 8"x10   CS seated rot isometric         10"x5 ea side    Seated CS rot - arm elevated         10x ea side    L biting exercise       8"x10      LT lift + lift off OTB  2x5            LT lifts with postural correction             Education time 2'     3' 5' 5'     Standing L lateral flexion training W/ 10 arm lifts            Standing DNF  30"x2  10x arm lifts                  8"x5  2 sets @ 75% opening       UT wall slide         5"x5  2 sets    Wall slide - SA          10x, 10x in side lying   Neck ext training           5"x5  2x10 arm lifts   Overhead flexion with CS control      OTB  2x10 ea side       Ther Ex                                                                                                                     Ther Activity                                       Gait Training                                       Modalities

## 2022-02-25 ENCOUNTER — APPOINTMENT (OUTPATIENT)
Dept: PHYSICAL THERAPY | Facility: REHABILITATION | Age: 57
End: 2022-02-25
Payer: COMMERCIAL

## 2022-02-25 NOTE — PROGRESS NOTES
Daily Note     Today's date: 2022  Patient name: Marita Presley  : 1965  MRN: [id number]  Referring provider: Mariana Paulino DO  Dx: No diagnosis found  Subjective: ***      Objective: See treatment diary below      Assessment: Tolerated treatment {Tolerated treatment :1189949013}   Patient {assessment:8733913829}      Plan: {PLAN:7430580864}     Precautions:     Manuals    assessment 5' 5'      15' 5' 5'   C1 R lateral glide        AB     C2-3 MET to L side glide      G5 mob   G5 mob    STM L SCM      AB  AB     Laser - L masseter       2' 2'      C4-5 R rot mob      G5 to R       STM L SCM AB AB           SOR - R       AB: static, 5x with L rot and flex AB     R side glide C4-5           G4   L OAJ distraction       G5      C1-2 R rot mob         G5    STM L lateral pterygoid       assessed      Neuro Re-Ed    Jaw flushing/opening             Supine DNF        5"x10 8"x10 8"x10   CS seated rot isometric         10"x5 ea side    Seated CS rot - arm elevated         10x ea side    L biting exercise       8"x10      LT lift + lift off OTB  2x5 OTB  2x5           LT lifts with postural correction             Education time 2' 2'    3' 5' 5'     Standing L lateral flexion training W/ 10 arm lifts W/ 10 arm lifts           Standing DNF  30"x2  10x arm lifts 30"x2 10 arm lifts                 8"x5  2 sets @ 75% opening       UT wall slide         5"x5  2 sets    Wall slide - SA          10x, 10x in side lying   Neck ext training           5"x5  2x10 arm lifts   Overhead flexion with CS control      OTB  2x10 ea side       Ther Ex                                                                                                                     Ther Activity                                       Gait Training                                       Modalities

## 2022-02-28 ENCOUNTER — OFFICE VISIT (OUTPATIENT)
Dept: PHYSICAL THERAPY | Facility: REHABILITATION | Age: 57
End: 2022-02-28
Payer: COMMERCIAL

## 2022-02-28 DIAGNOSIS — M26.609 TEMPORAL MANDIBULAR JOINT DISORDER: ICD-10-CM

## 2022-02-28 DIAGNOSIS — M54.2 NECK PAIN: Primary | ICD-10-CM

## 2022-02-28 PROCEDURE — 97140 MANUAL THERAPY 1/> REGIONS: CPT | Performed by: PHYSICAL THERAPIST

## 2022-02-28 PROCEDURE — 97112 NEUROMUSCULAR REEDUCATION: CPT | Performed by: PHYSICAL THERAPIST

## 2022-02-28 NOTE — PROGRESS NOTES
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        Start Time: 8333  Stop Time: 0920  Total time in clinic (min): 45 minutes    Subjective: Patient reports doing well, states that she did sit a lot this weekend at wrestling matches and held up well  Objective: See treatment diary below  Jaw opening: Normal  Hypomobile C2-3 into L side glide      Assessment: Tolerated treatment well  Patient provided with cues for LT engagement on L as she has as strong UT pull with rowing  By end of session she was able to grasp movement, if good recall wednesday, will add to HEP  Plan: Continue per plan of care        Precautions:     Manuals    assessment 5' 5' 5'     15' 5' 5'   C1 R lateral glide        AB     C2-3 MET to L side glide   AB 2 bouts   G5 mob   G5 mob    STM L SCM      AB  AB     Laser - L masseter       2' 2'      C4-5 R rot mob      G5 to R       STM L SCM AB AB R AB          SOR - R       AB: static, 5x with L rot and flex AB     R side glide C4-5           G4   L OAJ distraction       G5      C1-2 R rot mob         G5    STM L lateral pterygoid       assessed      Neuro Re-Ed    Jaw flushing/opening             Supine DNF        5"x10 8"x10 8"x10   CS seated rot isometric         10"x5 ea side    Seated CS rot - arm elevated         10x ea side    L biting exercise       8"x10      LT lift + lift off OTB  2x5 OTB  2x5           LT lifts with postural correction             Education time 2' 2'    3' 5' 5'     Standing L lateral flexion training W/ 10 arm lifts W/ 10 arm lifts 30"x2          Standing DNF  30"x2  10x arm lifts 30"x2 10 arm lifts 1'x3 with arm lifts          Row   2x10 OJB          UT wall slide 5"x5  2 sets    Wall slide - SA          10x, 10x in side lying   Neck ext training           5"x5  2x10 arm lifts   Overhead flexion with CS control      OTB  2x10 ea side       Ther Ex                                                                                                                     Ther Activity                                       Gait Training                                       Modalities

## 2022-03-02 ENCOUNTER — APPOINTMENT (OUTPATIENT)
Dept: PHYSICAL THERAPY | Facility: REHABILITATION | Age: 57
End: 2022-03-02
Payer: COMMERCIAL

## 2022-03-07 ENCOUNTER — OFFICE VISIT (OUTPATIENT)
Dept: PHYSICAL THERAPY | Facility: REHABILITATION | Age: 57
End: 2022-03-07
Payer: COMMERCIAL

## 2022-03-07 DIAGNOSIS — M54.2 NECK PAIN: Primary | ICD-10-CM

## 2022-03-07 DIAGNOSIS — M26.609 TEMPORAL MANDIBULAR JOINT DISORDER: ICD-10-CM

## 2022-03-07 PROCEDURE — 97112 NEUROMUSCULAR REEDUCATION: CPT | Performed by: PHYSICAL THERAPIST

## 2022-03-07 PROCEDURE — 97140 MANUAL THERAPY 1/> REGIONS: CPT | Performed by: PHYSICAL THERAPIST

## 2022-03-07 NOTE — PROGRESS NOTES
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        Start Time: 830  Stop Time: 915  Total time in clinic (min): 45 minutes    Subjective: Pt  Reports overall doing well but states that she did sit a lot this weekend and this time her neck pain did catch up to her  She also has some more jaw tightness sensation and pain as well  Objective: See treatment diary below  + click on opening  S curve  No limitation in opening  CS cleared prior to mobilizaiton    Assessment: Tolerated treatment well  Patient with resolved jaw clicking post joint mob  She also had improved OAJ mobility on left post self SNAGs  Added Self CS SNAG into rotation as well as retraction ext to HEP to perform while at wrestling matches  Plan: Continue per plan of care        Precautions:     Manuals 2/23 2/25 2/28 3/7  1/26 1/31 2/2 2/7 2/9   assessment 5' 5' 5'     15' 5' 5'   C1 R lateral glide    C1-2 R rot - G5    AB     C2-3 MET to L side glide   AB 2 bouts   G5 mob   G5 mob    STM L SCM      AB  AB     Laser - L masseter       2' 2'      C4-5 R rot mob      G5 to R       STM L SCM AB AB R AB          SOR - R       AB: static, 5x with L rot and flex AB     R side glide C4-5           G4   L OAJ distraction       G5      C1-2 R rot mob         G5    STM L lateral pterygoid       assessed      Neuro Re-Ed 2/23 2/25 2/28 3/7  1/26 1/31 2/2 2/7 2/9   Jaw flushing/opening             Supine DNF        5"x10 8"x10 8"x10   CS seated rot isometric         10"x5 ea side    Seated CS rot - arm elevated         10x ea side    L biting exercise       8"x10      LT lift + lift off OTB  2x5 OTB  2x5           LT lifts with postural correction    GTB  10"x10         Education time 2' 2'    3' 5' 5'     Standing L lateral flexion training W/ 10 arm lifts W/ 10 arm lifts 30"x2          Standing DNF  30"x2  10x arm lifts 30"x2 10 arm lifts 1'x3 with arm lifts          Row   2x10 OJB          UT wall slide         5"x5  2 sets    Wall slide - SA          10x, 10x in side lying   Neck ext training           5"x5  2x10 arm lifts   Self SNAG - ext    2x10         SELF SNAG - R rot    2x10         TMJ lateral deviation    2x5 L/R         Overhead flexion with CS control      OTB  2x10 ea side       Ther Ex                                                                                                                     Ther Activity                                       Gait Training                                       Modalities

## 2022-03-09 ENCOUNTER — OFFICE VISIT (OUTPATIENT)
Dept: PHYSICAL THERAPY | Facility: REHABILITATION | Age: 57
End: 2022-03-09
Payer: COMMERCIAL

## 2022-03-09 DIAGNOSIS — M54.2 NECK PAIN: Primary | ICD-10-CM

## 2022-03-09 DIAGNOSIS — M26.609 TEMPORAL MANDIBULAR JOINT DISORDER: ICD-10-CM

## 2022-03-09 PROCEDURE — 97112 NEUROMUSCULAR REEDUCATION: CPT | Performed by: PHYSICAL THERAPIST

## 2022-03-09 PROCEDURE — 97140 MANUAL THERAPY 1/> REGIONS: CPT | Performed by: PHYSICAL THERAPIST

## 2022-03-09 NOTE — PROGRESS NOTES
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        Start Time: 08  Stop Time: 930  Total time in clinic (min): 45 minutes    Subjective: Patient reports doing better then she was on Monday  Neck feeling better, less jaw pain  Objective: See treatment diary below  C curve - resolved post session with re-ed    Assessment: Tolerated treatment well  Education on continue CS HEP as she will be sitting a lot in 2 weeks at a wresting match  We reviewed CS rot and extension maneuvers  She has improved control with scapular retraction when rowing, good MT engagement  Plan: Continue per plan of care        Precautions:     Manuals 2/23 2/25 2/28 3/7 3/9 1/26 1/31 2/2 2/7 2/9   assessment 5' 5' 5'  5'   15' 5' 5'   C1 R lateral glide    C1-2 R rot - G5    AB     C2-3 MET to L side glide   AB 2 bouts  AB G5 mob   G5 mob    STM L SCM      AB  AB     Laser - L masseter       2' 2'      C4-5 R rot mob      G5 to R       STM L SCM AB AB R AB          SOR - R       AB: static, 5x with L rot and flex AB     R side glide C4-5           G4   L OAJ distraction       G5      C1-2 R rot mob         G5    STM L lateral pterygoid     L - 3 rds  assessed      Neuro Re-Ed 2/23 2/25 2/28 3/7 3   Jaw flushing/opening             Supine DNF        5"x10 8"x10 8"x10   CS seated rot isometric         10"x5 ea side    Seated CS rot - arm elevated         10x ea side    L biting exercise     5"x10  8"x10      LT lift + lift off OTB  2x5 OTB  2x5   GTB  10x        LT lifts with postural correction    GTB  10"x10         Education time 2' 2'    3' 5' 5'     Standing L lateral flexion training W/ 10 arm lifts W/ 10 arm lifts 30"x2          Standing DNF  30"x2  10x arm lifts 30"x2 10 arm lifts 1'x3 with arm lifts          Row   2x10 OJB  OJB-3x10        UT wall slide         5"x5  2 sets    Wall slide - SA          10x, 10x in side lying   Neck ext training           5"x5  2x10 arm lifts   Self SNAG - ext    2x10         SELF SNAG - R rot    2x10         TMJ lateral deviation    2x5 L/R 2x5 to R        Overhead flexion with CS control      OTB  2x10 ea side       Ther Ex                                                                                                                     Ther Activity                                       Gait Training                                       Modalities

## 2022-03-15 ENCOUNTER — APPOINTMENT (OUTPATIENT)
Dept: PHYSICAL THERAPY | Facility: REHABILITATION | Age: 57
End: 2022-03-15
Payer: COMMERCIAL

## 2022-03-21 ENCOUNTER — OFFICE VISIT (OUTPATIENT)
Dept: PHYSICAL THERAPY | Facility: REHABILITATION | Age: 57
End: 2022-03-21
Payer: COMMERCIAL

## 2022-03-21 DIAGNOSIS — M54.2 NECK PAIN: ICD-10-CM

## 2022-03-21 DIAGNOSIS — M26.609 TEMPORAL MANDIBULAR JOINT DISORDER: Primary | ICD-10-CM

## 2022-03-21 PROCEDURE — 97112 NEUROMUSCULAR REEDUCATION: CPT | Performed by: PHYSICAL THERAPIST

## 2022-03-21 PROCEDURE — 97140 MANUAL THERAPY 1/> REGIONS: CPT | Performed by: PHYSICAL THERAPIST

## 2022-03-21 NOTE — PROGRESS NOTES
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    2  Neck pain  M54 2        Start Time: 0840  Stop Time: 5923  Total time in clinic (min): 45 minutes    Subjective: Patient reports that her neck and jaw did well since last session with added stress of sitting out at [employer] for wrestling  Objective: See treatment diary below  Tone increases B/L UT  Normal CS segmental mobility with exception of C1-2 rotation limitation    Assessment: Tolerated treatment well  Patient engaged MT/LT well without excessive UT compensation  Will follow up in 2 weeks with likely DC to follow  Educated on continued HEP for CS, jaw and scapula  Plan: Continue per plan of care        Precautions: standard    Manuals 2/23 2/25 2/28 3/7 3/9 3/21       assessment 5' 5' 5'  5' 5'       C1 R lateral glide    C1-2 R rot - G5         C2-3 MET to L side glide   AB 2 bouts  AB        STM L SCM             Laser - L masseter              C4-5 R rot mob             STM L SCM AB AB R AB          SOR - R             R side glide C4-5              L OAJ distraction             STM B/L UT, L > R, CS paraspinals      10'       C1-2 rot mob      AB       STM L lateral pterygoid     L - 3 rds        Neuro Re-Ed 2/23 2/25 2/28 3/7 3/9 3/21       Jaw flushing/opening             Supine DNF             CS seated rot isometric             Seated CS rot - arm elevated             L biting exercise     5"x10        LT lift + lift off OTB  2x5 OTB  2x5   GTB  10x OTB  10x       LT lifts with postural correction    GTB  10"x10  OTB  5"x10       Education time 2' 2'           Standing L lateral flexion training W/ 10 arm lifts W/ 10 arm lifts 30"x2          Standing DNF  30"x2  10x arm lifts 30"x2 10 arm lifts 1'x3 with arm lifts          Row   2x10 OJB  OJB-3x10 OJB  10x b/l       UT wall slide             Wall slide - SA             Neck ext training              Self SNAG - ext    2x10         SELF SNAG - R rot    2x10         TMJ lateral deviation    2x5 L/R 2x5 to R        Overhead flexion with CS control             Ther Ex                                                                                                                     Ther Activity                                       Gait Training                                       Modalities

## 2022-04-06 ENCOUNTER — OFFICE VISIT (OUTPATIENT)
Dept: PHYSICAL THERAPY | Facility: REHABILITATION | Age: 57
End: 2022-04-06
Payer: COMMERCIAL

## 2022-04-06 DIAGNOSIS — M26.609 TEMPORAL MANDIBULAR JOINT DISORDER: Primary | ICD-10-CM

## 2022-04-06 DIAGNOSIS — M54.2 NECK PAIN: ICD-10-CM

## 2022-04-06 PROCEDURE — 97140 MANUAL THERAPY 1/> REGIONS: CPT | Performed by: PHYSICAL THERAPIST

## 2022-04-06 PROCEDURE — 97112 NEUROMUSCULAR REEDUCATION: CPT | Performed by: PHYSICAL THERAPIST

## 2022-04-06 NOTE — PROGRESS NOTES
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    2  Neck pain  M54 2        Start Time: 1050  Stop Time: 1130  Total time in clinic (min): 40 minutes    Subjective: Patient reports that she has held up very well over the last 3 weeks  She had difficulty sleeping situations where her neck was not in the best positions  She was able to self manage her neck pain and jaw pain if it occurred  She reports that she only had 1 headache over the last 3 weeks  Objective: See treatment diary below  CS AROM: WNL with exception of mild end range R rot deficit  Jaw: opening 50 mm  No deviation  Hypomobile C2-3 into L side glide  Assessment: Patient is a 64y o  year old female who attended physical therapy for left sided TMJD  Patient reports improvement at this time which correlates to improved impairments and functionality  Patient has shown improvement throughout PT by demonstrating decreased pain, increased range of motion, increased strength and improved tolerance to activity  Will DC PT at this time  Patient is independent with HEP, able to self manage symptoms well  Educated on DA if she does need to return to PT          Plan: DC PT    Precautions: standard    Manuals 2/23 2/25 2/28 3/7 3/9 3/21 4/6      assessment 5' 5' 5'  5' 5' 15'      C1 R lateral glide    C1-2 R rot - G5         C2-3 MET to L side glide   AB 2 bouts  AB  G5 distraction (CS cleared)      STM L SCM             Laser - L masseter              C4-5 R rot mob             STM L SCM AB AB R AB          SOR - R             R side glide C4-5              L OAJ distraction             STM B/L UT, L > R, CS paraspinals      10'       C1-2 rot mob      AB       STM L lateral pterygoid     L - 3 rds        Neuro Re-Ed 2/23 2/25 2/28 3/7 3/9 3/21 46 Jaw flushing/opening             Supine DNF             CS seated rot isometric             Seated CS rot - arm elevated             L biting exercise     5"x10        LT lift + lift off OTB  2x5 OTB  2x5   GTB  10x OTB  10x       LT lifts with postural correction    GTB  10"x10  OTB  5"x10       Education time 2' 2'     15'      Standing L lateral flexion training W/ 10 arm lifts W/ 10 arm lifts 30"x2          Standing DNF  30"x2  10x arm lifts 30"x2 10 arm lifts 1'x3 with arm lifts          Row   2x10 OJB  OJB-3x10 OJB  10x b/l       UT wall slide             Wall slide - SA             Neck ext training              Self SNAG - ext    2x10         SELF SNAG - R rot    2x10         TMJ lateral deviation    2x5 L/R 2x5 to R        Overhead flexion with CS control             Ther Ex                                                                                                                     Ther Activity                                       Gait Training                                       Modalities

## 2022-04-25 ENCOUNTER — ANNUAL EXAM (OUTPATIENT)
Dept: OBGYN CLINIC | Facility: CLINIC | Age: 57
End: 2022-04-25
Payer: COMMERCIAL

## 2022-04-25 VITALS
WEIGHT: 173.6 LBS | DIASTOLIC BLOOD PRESSURE: 76 MMHG | SYSTOLIC BLOOD PRESSURE: 110 MMHG | HEIGHT: 65 IN | BODY MASS INDEX: 28.92 KG/M2

## 2022-04-25 DIAGNOSIS — Z12.31 ENCOUNTER FOR SCREENING MAMMOGRAM FOR BREAST CANCER: ICD-10-CM

## 2022-04-25 DIAGNOSIS — Z01.419 ROUTINE GYNECOLOGICAL EXAMINATION: Primary | ICD-10-CM

## 2022-04-25 PROCEDURE — G0145 SCR C/V CYTO,THINLAYER,RESCR: HCPCS | Performed by: OBSTETRICS & GYNECOLOGY

## 2022-04-25 PROCEDURE — G0476 HPV COMBO ASSAY CA SCREEN: HCPCS | Performed by: OBSTETRICS & GYNECOLOGY

## 2022-04-25 PROCEDURE — S0612 ANNUAL GYNECOLOGICAL EXAMINA: HCPCS | Performed by: OBSTETRICS & GYNECOLOGY

## 2022-04-25 NOTE — PROGRESS NOTES
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 [employer]  Kids are doing well - 15 and 13, both very active in sports - at a mom taxi stage of life  She does not report urinary incontinence, urinary concerns, bowel problems, breast concerns  Last Pap: 2/19/18 - Normal Cytology, Negative HPV  Last Mammo: 8/23/21- [facility] 1  Last Colonoscopy: 9/14/15 - 10yr recall     We reviewed ASCCP guidelines for Pap testing       Family hx of breast cancer: no  Family hx of ovarian cancer: no  Family hx of colon cancer: no      Current Outpatient Medications:     Cholecalciferol (VITAMIN D3 PO), Take by mouth, Disp: , Rfl:     ibuprofen (MOTRIN) 600 mg tablet, Take by mouth every 6 (six) hours as needed for mild pain, Disp: , Rfl:     levothyroxine 75 mcg tablet, take 1 tablet by mouth once daily, Disp: 30 tablet, Rfl: 5    omega-3-acid ethyl esters (LOVAZA) 1 g capsule, Take 2 g by mouth 2 (two) times a day, Disp: , Rfl:   Patient Active Problem List   Diagnosis    Encounter for screening mammogram for malignant neoplasm of breast    Screening for human papillomavirus (HPV)    Encounter for gynecological examination without abnormal finding    Encounter for gynecological examination (general) (routine) without abnormal findings    Screening for malignant neoplasm of breast    Thyroid nodule    Need for vaccination    Generalized anxiety disorder    Post-operative hypothyroidism    Nasal pain    Annual physical exam    Encounter for lipid screening for cardiovascular disease    Screening for diabetes mellitus    PND (post-nasal drip)     Family History   Problem Relation Age of Onset    Hypertension Mother    [place] Lymphoma Mother     No Known Problems Father     Cancer Brother 32        Adrenal    Hypertension Family     Irritable bowel syndrome Family     Cancer Family         Intestinal Cancer    Varicose Veins Family     No Known Problems Sister     No Known Problems Daughter     No Known Problems Maternal Grandmother     No Known Problems Maternal Grandfather     No Known Problems Paternal Grandmother     No Known Problems Paternal Grandfather     No Known Problems Paternal Aunt     No Known Problems Paternal Aunt      Past Medical History:   Diagnosis Date    Anxiety     Chest pain     last assessed - 09Apr2015    Disease of thyroid gland     Headache     Hypokalemia     last assessed - 09Apr2015    PONV (postoperative nausea and vomiting)     Shingles 07/2018        Review of Systems   Respiratory: Negative  Cardiovascular: Negative  Gastrointestinal: Negative for constipation and diarrhea  Genitourinary: Negative for difficulty urinating, pelvic pain, vaginal bleeding, vaginal discharge, itching or odor  O:  Blood pressure 110/76, height 5' 5" (1 651 m), weight 78 7 kg (173 lb 9 6 oz), last menstrual period 10/01/2020  Patient appears well and is not in distress  Breasts are symmetrical without mass, tenderness, nipple discharge, skin changes or adenopathy  Abdomen is soft and nontender without masses  External genitals are normal without lesions or rashes  Urethral meatus and urethra are normal  Bladder is normal to palpation  Vagina is normal without discharge or bleeding, generalized atrophic changes present   Cervix is normal without discharge or lesion  Uterus is normal, mobile, nontender without palpable mass  Adnexa are normal, nontender, without palpable mass       A:  Yearly exam      P:   Pap & HPV today  Mammo ordered   Colon Cancer Screening up to date     Reviewed considerations of menopause, to call with any postmenopausal bleeding or other concerns  RTO one year for yearly exam or sooner as needed

## 2022-04-25 NOTE — PROGRESS NOTES
Patient's Lab: [facility]     Last Yearly: 4/19/21  Last Pap: 2/19/18  Results: -/-  Last Mammo Date: 8/23/21  Birads: 1-  Lifetime Risk Assessment: 7 08%  Next Mammo Scheduled: No  Script Needed: Yes  Colon: 9/14/15-10yr  recall  Postmenopausal  Any VB: None  S/P Covid Shot: Completed + Booster  Sexually Active: Yes      Family hx of breast cancer: no  Family hx of ovarian cancer: no  Family hx of colon cancer: no    Q's/Concerns: None

## 2022-04-26 LAB
HPV HR 12 DNA CVX QL NAA+PROBE: NEGATIVE
HPV16 DNA CVX QL NAA+PROBE: NEGATIVE
HPV18 DNA CVX QL NAA+PROBE: NEGATIVE

## 2022-04-29 LAB
LAB AP GYN PRIMARY INTERPRETATION: NORMAL
Lab: NORMAL

## 2022-08-27 ENCOUNTER — HOSPITAL ENCOUNTER (OUTPATIENT)
Dept: MAMMOGRAPHY | Facility: HOSPITAL | Age: 57
Discharge: HOME | End: 2022-08-27
Attending: OBSTETRICS & GYNECOLOGY
Payer: COMMERCIAL

## 2022-08-27 DIAGNOSIS — Z12.31 ENCOUNTER FOR SCREENING MAMMOGRAM FOR MALIGNANT NEOPLASM OF BREAST: ICD-10-CM

## 2022-08-27 DIAGNOSIS — Z12.31 ENCOUNTER FOR SCREENING MAMMOGRAM FOR BREAST CANCER: ICD-10-CM

## 2022-08-27 PROCEDURE — 77067 SCR MAMMO BI INCL CAD: CPT

## 2022-08-27 PROCEDURE — 77063 BREAST TOMOSYNTHESIS BI: CPT

## 2022-11-08 ENCOUNTER — IMMUNIZATIONS (OUTPATIENT)
Dept: FAMILY MEDICINE CLINIC | Facility: CLINIC | Age: 57
End: 2022-11-08

## 2022-11-08 DIAGNOSIS — Z23 ENCOUNTER FOR IMMUNIZATION: Primary | ICD-10-CM

## 2022-11-18 ENCOUNTER — TELEPHONE (OUTPATIENT)
Dept: OTHER | Facility: OTHER | Age: 57
End: 2022-11-18

## 2022-11-18 DIAGNOSIS — E89.0 POST-OPERATIVE HYPOTHYROIDISM: Primary | ICD-10-CM

## 2022-11-18 NOTE — TELEPHONE ENCOUNTER
Patient stated she is at the [facility] lab in [facility] now to get bloodwork done that was requested to be done before her appointment on Weds 11/23  However, the lab stated there is nothing in the system  Please have the doctor place her annual labwork, including thyroid screen, into the patient's chart ASAP

## 2022-11-21 ENCOUNTER — APPOINTMENT (OUTPATIENT)
Dept: LAB | Facility: CLINIC | Age: 57
End: 2022-11-21

## 2022-11-21 DIAGNOSIS — Z13.220 SCREENING, LIPID: Primary | ICD-10-CM

## 2022-11-21 DIAGNOSIS — Z13.0 SCREENING, ANEMIA, DEFICIENCY, IRON: ICD-10-CM

## 2022-11-21 DIAGNOSIS — E89.0 POST-OPERATIVE HYPOTHYROIDISM: ICD-10-CM

## 2022-11-21 DIAGNOSIS — Z13.220 SCREENING, LIPID: ICD-10-CM

## 2022-11-21 LAB
CHOLEST SERPL-MCNC: 173 MG/DL
HDLC SERPL-MCNC: 58 MG/DL
LDLC SERPL CALC-MCNC: 99 MG/DL (ref 0–100)
TRIGL SERPL-MCNC: 81 MG/DL
TSH SERPL DL<=0.05 MIU/L-ACNC: 1.47 UIU/ML (ref 0.45–4.5)

## 2022-11-23 ENCOUNTER — OFFICE VISIT (OUTPATIENT)
Dept: FAMILY MEDICINE CLINIC | Facility: CLINIC | Age: 57
End: 2022-11-23

## 2022-11-23 VITALS
BODY MASS INDEX: 27.26 KG/M2 | RESPIRATION RATE: 14 BRPM | DIASTOLIC BLOOD PRESSURE: 80 MMHG | OXYGEN SATURATION: 97 % | SYSTOLIC BLOOD PRESSURE: 124 MMHG | TEMPERATURE: 97.4 F | HEART RATE: 60 BPM | WEIGHT: 163.6 LBS | HEIGHT: 65 IN

## 2022-11-23 DIAGNOSIS — Z00.00 ANNUAL PHYSICAL EXAM: Primary | ICD-10-CM

## 2022-11-23 DIAGNOSIS — E89.0 POST-OPERATIVE HYPOTHYROIDISM: ICD-10-CM

## 2022-11-23 DIAGNOSIS — F41.1 GENERALIZED ANXIETY DISORDER: ICD-10-CM

## 2022-11-23 PROBLEM — J34.89 NASAL PAIN: Status: RESOLVED | Noted: 2020-11-05 | Resolved: 2022-11-23

## 2022-11-23 RX ORDER — MONTELUKAST SODIUM 10 MG/1
10 TABLET ORAL
COMMUNITY

## 2022-11-23 RX ORDER — FLUOCINOLONE ACETONIDE 0.25 MG/G
OINTMENT TOPICAL 2 TIMES DAILY PRN
COMMUNITY
Start: 2022-11-04

## 2022-11-23 NOTE — PROGRESS NOTES
[facility]    NAME: Nishant Oliver  AGE: 62 y o  SEX: female  : 1965     DATE: 2022     Assessment and Plan:     Problem List Items Addressed This Visit        Endocrine    Post-operative hypothyroidism    Relevant Orders    TSH, 3rd generation with Free T4 reflex       Other    Generalized anxiety disorder     Stable and using coping techniques         Annual physical exam - Primary     Flu shot up to date            Immunizations and preventive care screenings were discussed with patient today  Appropriate education was printed on patient's after visit summary  Counseling:  Dental Health: discussed importance of regular tooth brushing, flossing, and dental visits  BMI Counseling: Body mass index is 26 99 kg/m²  The BMI is above normal  Nutrition recommendations include encouraging healthy choices of fruits and vegetables  Exercise recommendations include exercising 3-5 times per week  No pharmacotherapy was ordered  Rationale for BMI follow-up plan is due to patient being overweight or obese  Depression Screening and Follow-up Plan: Patient was screened for depression during today's encounter  They screened negative with a PHQ-2 score of 0  Return in 1 year (on 2023)  Chief Complaint:     Chief Complaint   Patient presents with   • Annual Exam     Patient here for Annual wellness exam       History of Present Illness:     Adult Annual Physical   Patient here for a comprehensive physical exam  The patient reports no problems  Diet and Physical Activity  Diet/Nutrition: well balanced diet  Exercise: no formal exercise        Depression Screening  PHQ-2/9 Depression Screening    Little interest or pleasure in doing things: 0 - not at all  Feeling down, depressed, or hopeless: 0 - not at all  PHQ-2 Score: 0  PHQ-2 Interpretation: Negative depression screen       General Health  Sleep: sleeps well    Hearing: normal - bilateral   Vision: wears glasses  Dental: brushes teeth twice daily  /GYN Health  Patient is: postmenopausal  Last menstrual period: n/a  Contraceptive method: n/a  Review of Systems:     Review of Systems   Constitutional: Negative  HENT: Negative  Eyes: Negative  Respiratory: Negative  Cardiovascular: Negative  Gastrointestinal: Negative  Endocrine: Negative  Genitourinary: Negative  Musculoskeletal: Negative  Skin: Negative  Allergic/Immunologic: Negative  Neurological: Negative  Hematological: Negative  Psychiatric/Behavioral: Negative         Past Medical History:     Past Medical History:   Diagnosis Date   • Anxiety    • Chest pain     last assessed - 2015   • Disease of thyroid gland    • Headache    • Hypokalemia     last assessed - 2015   • PONV (postoperative nausea and vomiting)    • Shingles 2018      Past Surgical History:     Past Surgical History:   Procedure Laterality Date   • CHOLECYSTECTOMY     • COLONOSCOPY     • COLPOSCOPY     • GALLBLADDER SURGERY     • OTHER SURGICAL HISTORY      Contraceptives; last assessed - 2012   • RI THYROID LOBECTOMY,UNILAT Left 2019    Procedure: THYROID LOBECTOMY; POSSIBLE TOTAL THYROIDECTOMY;  Surgeon: Dilshad Schultz MD;  Location[facility];  Service: ENT   • TOOTH EXTRACTION     • US GUIDED THYROID BIOPSY  10/30/2019      Social History:     Social History     Socioeconomic History   • Marital status: /Civil Union     Spouse name: None   • Number of children: None   • Years of education: None   • Highest education level: None   Occupational History   • None   Tobacco Use   • Smoking status: Former     Types: Cigarettes     Quit date: 1999     Years since quittin 0   • Smokeless tobacco: Never   Vaping Use   • Vaping Use: Never used   Substance and Sexual Activity   • Alcohol use: Yes     Comment: social   • Drug use: No   • Sexual activity: Yes Partners: Male     Birth control/protection: Post-menopausal   Other Topics Concern   • None   Social History Narrative    Daily coffee consumption (___Cups/Day)    Exercise Frequency (times/Week)     Social Determinants of Health     Financial Resource Strain: Not on file   Food Insecurity: Not on file   Transportation Needs: Not on file   Physical Activity: Not on file   Stress: Not on file   Social Connections: Not on file   Intimate Partner Violence: Not on file   Housing Stability: Not on file      Family History:     Family History   Problem Relation Age of Onset   • Hypertension Mother    • Lymphoma Mother    • No Known Problems Father    • Cancer Brother 32        Adrenal   • Hypertension Family    • Irritable bowel syndrome Family    • Cancer Family         Intestinal Cancer   • Varicose Veins Family    • No Known Problems Sister    • No Known Problems Daughter    • No Known Problems Maternal Grandmother    • No Known Problems Maternal Grandfather    • No Known Problems Paternal Grandmother    • No Known Problems Paternal Grandfather    • No Known Problems Paternal Aunt    • No Known Problems Paternal Aunt       Current Medications:     Current Outpatient Medications   Medication Sig Dispense Refill   • Cholecalciferol (VITAMIN D3 PO) Take by mouth     • fluocinolone (SYNALAR) 0 025 % ointment 2 (two) times a day as needed     • ibuprofen (MOTRIN) 600 mg tablet Take by mouth every 6 (six) hours as needed for mild pain     • levothyroxine 75 mcg tablet take 1 tablet by mouth once daily 30 tablet 0   • montelukast (Singulair) 10 mg tablet Take 10 mg by mouth daily at bedtime     • omega-3-acid ethyl esters (LOVAZA) 1 g capsule Take 2 g by mouth 2 (two) times a day (Patient not taking: Reported on 11/23/2022)       No current facility-administered medications for this visit  Allergies:      Allergies   Allergen Reactions   • Amoxicillin Hives     Reaction Date: [id number];    • Codeine Nausea Only and Vomiting     Reaction Date: 05Jul2011;    • Molds & Smuts       Physical Exam:     /80 (BP Location: Left arm, Patient Position: Sitting, Cuff Size: Standard)   Pulse 60   Temp (!) 97 4 °F (36 3 °C) (Tympanic)   Resp 14   Ht 5' 5 28" (1 658 m)   Wt 74 2 kg (163 lb 9 6 oz)   LMP 10/01/2020 (Exact Date)   SpO2 97%   BMI 26 99 kg/m²     Physical Exam  Vitals and nursing note reviewed  Constitutional:       Appearance: Normal appearance  She is well-developed and well-nourished  HENT:      Head: Normocephalic and atraumatic  Right Ear: Tympanic membrane, ear canal and external ear normal       Left Ear: Tympanic membrane, ear canal and external ear normal       Nose: Nose normal       Mouth/Throat:      Mouth: Oropharynx is clear and moist    Eyes:      Extraocular Movements: Extraocular movements intact and EOM normal       Conjunctiva/sclera: Conjunctivae normal       Pupils: Pupils are equal, round, and reactive to light  Cardiovascular:      Rate and Rhythm: Normal rate and regular rhythm  Pulses: Normal pulses and intact distal pulses  Heart sounds: Normal heart sounds  Pulmonary:      Effort: Pulmonary effort is normal       Breath sounds: Normal breath sounds  Abdominal:      General: Bowel sounds are normal       Palpations: Abdomen is soft  Musculoskeletal:         General: Normal range of motion  Cervical back: Normal range of motion and neck supple  Skin:     General: Skin is warm and dry  Capillary Refill: Capillary refill takes less than 2 seconds  Neurological:      General: No focal deficit present  Mental Status: She is alert and oriented to person, place, and time  Psychiatric:         Mood and Affect: Mood and affect and mood normal          Behavior: Behavior normal          Thought Content:  Thought content normal          Judgment: Judgment normal           Layla Bowers, DO  [facility]

## 2022-11-23 NOTE — PATIENT INSTRUCTIONS

## 2022-12-04 DIAGNOSIS — E03.9 HYPOTHYROIDISM, UNSPECIFIED TYPE: ICD-10-CM

## 2022-12-04 RX ORDER — LEVOTHYROXINE SODIUM 0.07 MG/1
TABLET ORAL
Qty: 30 TABLET | Refills: 0 | Status: SHIPPED | OUTPATIENT
Start: 2022-12-04 | End: 2022-12-06 | Stop reason: SDUPTHER

## 2022-12-06 DIAGNOSIS — E03.9 HYPOTHYROIDISM, UNSPECIFIED TYPE: ICD-10-CM

## 2022-12-06 RX ORDER — LEVOTHYROXINE SODIUM 0.07 MG/1
75 TABLET ORAL DAILY
Qty: 90 TABLET | Refills: 3 | Status: SHIPPED | OUTPATIENT
Start: 2022-12-06

## 2023-03-07 ENCOUNTER — TELEPHONE (OUTPATIENT)
Dept: FAMILY MEDICINE CLINIC | Facility: CLINIC | Age: 58
End: 2023-03-07

## 2023-03-07 ENCOUNTER — TELEMEDICINE (OUTPATIENT)
Dept: FAMILY MEDICINE CLINIC | Facility: CLINIC | Age: 58
End: 2023-03-07

## 2023-03-07 VITALS — TEMPERATURE: 98.4 F | BODY MASS INDEX: 26.66 KG/M2 | WEIGHT: 160 LBS | HEIGHT: 65 IN

## 2023-03-07 DIAGNOSIS — U07.1 COVID-19: Primary | ICD-10-CM

## 2023-03-07 RX ORDER — NIRMATRELVIR AND RITONAVIR 300-100 MG
3 KIT ORAL 2 TIMES DAILY
Qty: 30 TABLET | Refills: 0 | Status: SHIPPED | OUTPATIENT
Start: 2023-03-07 | End: 2023-03-12

## 2023-03-07 NOTE — PROGRESS NOTES
Virtual Regular Visit    Verification of patient location:    Patient is located in the following state in which I hold an active license {[facility] virtual patient location:[id number]}      Assessment/Plan:    Problem List Items Addressed This Visit    None           Reason for visit is   Chief Complaint   Patient presents with   • COVID-19     Covid positive yesterday- body aches, low grade fever, nasal congestion, cough, headache, nausea, diarrhea-symptoms since Sunday   • Virtual Regular Visit        Encounter provider BETTY Francois    Provider located at [address] [id number]      Recent Visits  No visits were found meeting these conditions  Showing recent visits within past 7 days and meeting all other requirements  Today's Visits  Date Type Provider Dept   03/07/23 Telemedicine Yosef Villaseñor, [address] [place] [address] [facility]p   03/07/23 Telephone Adelina Garcia   Showing today's visits and meeting all other requirements  Future Appointments  No visits were found meeting these conditions  Showing future appointments within next 150 days and meeting all other requirements       The patient was identified by name and date of birth  Marybeth Blunt was informed that this is a telemedicine visit and that the visit is being conducted through {AMB VIRTUAL VISIT UWELNV:43838}  {Telemedicine confidentiality :92275} {Telemedicine participants:70154}  She acknowledged consent and understanding of privacy and security of the video platform  The patient has agreed to participate and understands they can discontinue the visit at any time  Patient is aware this is a billable service  Subjective  Marybeth Blunt is a 62 y o  female ***         HPI     Past Medical History:   Diagnosis Date   • Anxiety    • Chest pain     last assessed - 09Apr2015   • COVID-19 03/06/2023   • Disease of thyroid gland    • Headache    • Hypokalemia     last assessed - 09Apr2015   • PONV (postoperative nausea and vomiting)    • Shingles 07/2018       Past Surgical History:   Procedure Laterality Date   • CHOLECYSTECTOMY     • COLONOSCOPY     • COLPOSCOPY     • GALLBLADDER SURGERY     • OTHER SURGICAL HISTORY      Contraceptives; last assessed - 11Jul2012   • [place] TOTAL THYROID LOBECTOMY UNI W/WO ISTHMUSECTOMY Left 11/27/2019    Procedure: THYROID LOBECTOMY; POSSIBLE TOTAL THYROIDECTOMY;  Surgeon: Stefanie Lee MD;  Location[facility];  Service: ENT   • TOOTH EXTRACTION     • US GUIDED THYROID BIOPSY  10/30/2019       Current Outpatient Medications   Medication Sig Dispense Refill   • Cholecalciferol (VITAMIN D3 PO) Take by mouth     • ibuprofen (MOTRIN) 600 mg tablet Take by mouth every 6 (six) hours as needed for mild pain     • levothyroxine 75 mcg tablet Take 1 tablet (75 mcg total) by mouth daily 90 tablet 3   • montelukast (SINGULAIR) 10 mg tablet Take 10 mg by mouth daily at bedtime     • omega-3-acid ethyl esters (LOVAZA) 1 g capsule Take 2 g by mouth 2 (two) times a day     • fluocinolone (SYNALAR) 0 025 % ointment 2 (two) times a day as needed (Patient not taking: Reported on 3/7/2023)       No current facility-administered medications for this visit          Allergies   Allergen Reactions   • Amoxicillin Hives     Reaction Date: [id number];    • Codeine Nausea Only and Vomiting     Reaction Date: 05Jul2011;    • Molds & Smuts        Review of Systems    Video Exam    Vitals:    03/07/23 1613   Temp: 98 4 °F (36 9 °C)   TempSrc: Tympanic   Weight: 72 6 kg (160 lb)   Height: 5' 5" (1 651 m)       Physical Exam     {Time Spent:62088}

## 2023-03-07 NOTE — PROGRESS NOTES
COVID-19 Outpatient Progress Note    Assessment/Plan:    Problem List Items Addressed This Visit    None  Visit Diagnoses     COVID-19    -  Primary    Relevant Medications    nirmatrelvir & ritonavir (Paxlovid, 300/100,) tablet therapy pack         Disposition:     Discussed symptom directed medication options with patient  Patient meets criteria for PAXLOVID and they have been counseled appropriately according to EUA documentation released by the FDA  After discussion, patient agrees to treatment  Wilhemenia Landau is an investigational medicine used to treat mild-to-moderate COVID-19 in adults and children (15years of age and older weighing at least 80 pounds (40 kg)) with positive results of direct SARS-CoV-2 viral testing, and who are at high risk for progression to severe COVID-19, including hospitalization or death  PAXLOVID is investigational because it is still being studied  There is limited information about the safety and effectiveness of using PAXLOVID to treat people with mild-to-moderate COVID-19  The FDA has authorized the emergency use of PAXLOVID for the treatment of mild-tomoderate COVID-19 in adults and children (15years of age and older weighing at least 80 pounds (40 kg)) with a positive test for the virus that causes COVID-19, and who are at high risk for progression to severe COVID-19, including hospitalization or death, under an EUA  What should I tell my healthcare provider before I take PAXLOVID? Tell your healthcare provider if you:  - Have any allergies  - Have liver or kidney disease  - Are pregnant or plan to become pregnant  - Are breastfeeding a child  - Have any serious illnesses    Tell your healthcare provider about all the medicines you take, including prescription and over-the-counter medicines, vitamins, and herbal supplements  Some medicines may interact with PAXLOVID and may cause serious side effects   Keep a list of your medicines to show your healthcare provider and pharmacist when you get a new medicine  You can ask your healthcare provider or pharmacist for a list of medicines that interact with PAXLOVID  Do not start taking a new medicine without telling your healthcare provider  Your healthcare provider can tell you if it is safe to take PAXLOVID with other medicines  Tell your healthcare provider if you are taking combined hormonal contraceptive  PAXLOVID may affect how your birth control pills work  Females who are able to become pregnant should use another effective alternative form of contraception or an additional barrier method of contraception  Talk to your healthcare provider if you have any questions about contraceptive methods that might be right for you  How do I take PAXLOVID? PAXLOVID consists of 2 medicines: nirmatrelvir and ritonavir  - Take 2 pink tablets of nirmatrelvir with 1 white tablet of ritonavir by mouth 2 times each day (in the morning and in the evening) for 5 days  For each dose, take all 3 tablets at the same time  - If you have kidney disease, talk to your healthcare provider  You may need a different dose  - Swallow the tablets whole  Do not chew, break, or crush the tablets  - Take PAXLOVID with or without food  - Do not stop taking PAXLOVID without talking to your healthcare provider, even if you feel better  - If you miss a dose of PAXLOVID within 8 hours of the time it is usually taken, take it as soon as you remember  If you miss a dose by more than 8 hours, skip the missed dose and take the next dose at your regular time  Do not take 2 doses of PAXLOVID at the same time  - If you take too much PAXLOVID, call your healthcare provider or go to the nearest hospital emergency room right away    - If you are taking a ritonavir- or cobicistat-containing medicine to treat hepatitis C or Human Immunodeficiency Virus (HIV), you should continue to take your medicine as prescribed by your healthcare provider   - Talk to your healthcare provider if you do not feel better or if you feel worse after 5 days  Who should generally not take PAXLOVID? Do not take PAXLOVID if:  You are allergic to nirmatrelvir, ritonavir, or any of the ingredients in PAXLOVID  You are taking any of the following medicines:  - Alfuzosin  - Pethidine, piroxicam, propoxyphene  - Ranolazine  - Amiodarone, dronedarone, flecainide, propafenone, quinidine  - Colchicine  - Lurasidone, pimozide, clozapine  - Dihydroergotamine, ergotamine, methylergonovine  - Lovastatin, simvastatin  - Sildenafil (Revatio®) for pulmonary arterial hypertension (PAH)  - Triazolam, oral midazolam  - Apalutamide  - Carbamazepine, phenobarbital, phenytoin  - Rifampin  - St  Dharmesh’s Wort (hypericum perforatum)    What are the important possible side effects of PAXLOVID? Possible side effects of PAXLOVID are:  - Liver Problems  Tell your healthcare provider right away if you have any of these signs and symptoms of liver problems: loss of appetite, yellowing of your skin and the whites of eyes (jaundice), dark-colored urine, pale colored stools and itchy skin, stomach area (abdominal) pain  - Resistance to HIV Medicines  If you have untreated HIV infection, PAXLOVID may lead to some HIV medicines not working as well in the future  - Other possible side effects include: altered sense of taste, diarrhea, high blood pressure, or muscle aches    These are not all the possible side effects of PAXLOVID  Not many people have taken PAXLOVID  Serious and unexpected side effects may happen  Samia Bowman is still being studied, so it is possible that all of the risks are not known at this time  What other treatment choices are there? Like Doroteo Shadow may allow for the emergency use of other medicines to treat people with COVID-19   Go to https://[employer]/ for information on the emergency use of other medicines that are authorized by FDA to treat people with COVID-19  Your healthcare provider may talk with you about clinical trials for which you may be eligible  It is your choice to be treated or not to be treated with PAXLOVID  Should you decide not to receive it or for your child not to receive it, it will not change your standard medical care  What if I am pregnant or breastfeeding? There is no experience treating pregnant women or breastfeeding mothers with PAXLOVID  For a mother and unborn baby, the benefit of taking PAXLOVID may be greater than the risk from the treatment  If you are pregnant, discuss your options and specific situation with your healthcare provider  It is recommended that you use effective barrier contraception or do not have sexual activity while taking PAXLOVID  If you are breastfeeding, discuss your options and specific situation with your healthcare provider  How do I report side effects with PAXLOVID? Contact your healthcare provider if you have any side effects that bother you or do not go away  Report side effects to FDA MedWatch at www fda gov/medwatch or call 7-162-WXT0849 or you can report side effects to [employer]  at the contact information provided below  Website Fax number Telephone number   [employer] [phone]     How should I store [address]? Store PAXLOVID tablets at room temperature between 68°F to 77°F (20°C to 25°C)      Full fact sheet for patients, parents, and caregivers can be found at: [employer]    I have spent a total time of 10 minutes on the day of the encounter for this patient including discussing diagnostic results, discussing prognosis, risks and benefits of treatment options, instructions for management, patient and family education, importance of treatment compliance, risk factor reductions, impressions, counseling/coordination of care, documenting in the medical record, reviewing/ordering tests, medicine, procedures and obtaining or reviewing history  Encounter provider: BETTY Gruber     Provider located at: [address] [place] [id number]     Recent Visits  No visits were found meeting these conditions  Showing recent visits within past 7 days and meeting all other requirements  Today's Visits  Date Type Provider Dept   03/07/23 Telemedicine Aguila Leach, [address] [facility]   03/07/23 Telephone Adelina Garcia   Showing today's visits and meeting all other requirements  Future Appointments  No visits were found meeting these conditions  Showing future appointments within next 150 days and meeting all other requirements     This virtual check-in was done via [employer] and patient was informed that this is a secure, HIPAA-compliant platform  She agrees to proceed  Patient agrees to participate in a virtual check in via telephone or video visit instead of presenting to the office to address urgent/immediate medical needs  Patient is aware this is a billable service  She acknowledged consent and understanding of privacy and security of the video platform  The patient has agreed to participate and understands they can discontinue the visit at any time  After connecting through [facility], the patient was identified by name and date of birth  Stacie Wooten was informed that this was a telemedicine visit and that the exam was being conducted confidentially over secure lines  My office door was closed  No one else was in the room  Stacie Wooten acknowledged consent and understanding of privacy and security of the telemedicine visit  I informed the patient that I have reviewed her record in Epic and presented the opportunity for her to ask any questions regarding the visit today  The patient agreed to participate      Verification of patient location:  Patient is located in the following state in which I hold an active license: [place]    Subjective:   Marybeth Blunt is a 62 y o  female who is concerned about COVID-19  Patient's symptoms include fever, chills, fatigue, malaise, nasal congestion, rhinorrhea, sore throat, cough, nausea, diarrhea, myalgias and headache  Patient denies anosmia, loss of taste, shortness of breath, chest tightness, abdominal pain and vomiting      - Date of symptom onset: 3/5/2023      COVID-19 vaccination status: Fully vaccinated with booster    Exposure:   Contact with a person who is under investigation (PUI) for or who is positive for COVID-19 within the last 14 days?: No    Hospitalized recently for fever and/or lower respiratory symptoms?: No      Currently a healthcare worker that is involved in direct patient care?: No      Works in a special setting where the risk of COVID-19 transmission may be high? (this may include long-term care, correctional and FPC facilities; homeless shelters; assisted-living facilities and group homes ): No      Resident in a special setting where the risk of COVID-19 transmission may be high? (this may include long-term care, correctional and [facility]PC facilities; homeless shelters; assisted-living facilities and group homes ): No      Hoarse voice  Isolating in guest bedroom  Symptoms started Sunday afternoon  Middle of night worsened  Monday am tested and was positive for covid 3/6  tmax 100  First night had chills, but none now        Lab Results   Component Value Date    SARSCOV2 Negative 02/10/2021    [address] [facility] 98 Not Detected 06/29/2020       Review of Systems   Constitutional: Positive for chills, fatigue and fever  HENT: Positive for congestion, postnasal drip, rhinorrhea, sore throat and voice change  Negative for ear pain  Eyes: Negative for photophobia and visual disturbance  Respiratory: Positive for cough  Negative for chest tightness and shortness of breath  Cardiovascular: Negative for chest pain and palpitations  Gastrointestinal: Positive for diarrhea and nausea  Negative for abdominal pain and vomiting  Genitourinary: Negative for dysuria and frequency  Musculoskeletal: Positive for myalgias  Skin: Negative for rash  Neurological: Positive for headaches  Negative for dizziness and light-headedness  Hematological: Negative for adenopathy  Psychiatric/Behavioral: Negative for dysphoric mood and sleep disturbance  The patient is not nervous/anxious  Current Outpatient Medications on File Prior to Visit   Medication Sig   • Cholecalciferol (VITAMIN D3 PO) Take by mouth   • ibuprofen (MOTRIN) 600 mg tablet Take by mouth every 6 (six) hours as needed for mild pain   • levothyroxine 75 mcg tablet Take 1 tablet (75 mcg total) by mouth daily   • montelukast (SINGULAIR) 10 mg tablet Take 10 mg by mouth daily at bedtime   • omega-3-acid ethyl esters (LOVAZA) 1 g capsule Take 2 g by mouth 2 (two) times a day   • fluocinolone (SYNALAR) 0 025 % ointment 2 (two) times a day as needed (Patient not taking: Reported on 3/7/2023)       Objective:    Temp 98 4 °F (36 9 °C) (Tympanic)   Ht 5' 5" (1 651 m)   Wt 72 6 kg (160 lb)   LMP 10/01/2020 (Exact Date)   BMI 26 63 kg/m²      Physical Exam  Constitutional:       Appearance: Normal appearance  HENT:      Head: Normocephalic and atraumatic  Eyes:      Conjunctiva/sclera: Conjunctivae normal    Pulmonary:      Effort: Pulmonary effort is normal    Musculoskeletal:         General: Normal range of motion  Cervical back: Normal range of motion  Neurological:      Mental Status: She is alert and oriented to person, place, and time  Psychiatric:         Mood and Affect: Mood normal          Behavior: Behavior normal          Thought Content:  Thought content normal          Judgment: Judgment normal        BETTY Ring

## 2023-03-07 NOTE — TELEPHONE ENCOUNTER
Patient called to schedule Covid positive virtual, symptoms started Sunday,she has a cough, body aches, fever, nasal congestion, diarrhea symptoms  You only have one same day left for tomorrow, not sure if you want me to use that spot I have her in for Thursday at 9:30   Please advise

## 2023-03-23 ENCOUNTER — PATIENT MESSAGE (OUTPATIENT)
Dept: FAMILY MEDICINE CLINIC | Facility: CLINIC | Age: 58
End: 2023-03-23

## 2023-03-24 ENCOUNTER — APPOINTMENT (OUTPATIENT)
Dept: RADIOLOGY | Facility: CLINIC | Age: 58
End: 2023-03-24

## 2023-03-24 ENCOUNTER — OFFICE VISIT (OUTPATIENT)
Dept: FAMILY MEDICINE CLINIC | Facility: CLINIC | Age: 58
End: 2023-03-24

## 2023-03-24 VITALS
OXYGEN SATURATION: 97 % | DIASTOLIC BLOOD PRESSURE: 82 MMHG | TEMPERATURE: 98.1 F | RESPIRATION RATE: 16 BRPM | BODY MASS INDEX: 27.46 KG/M2 | HEART RATE: 59 BPM | SYSTOLIC BLOOD PRESSURE: 136 MMHG | WEIGHT: 165 LBS

## 2023-03-24 DIAGNOSIS — R05.3 CHRONIC COUGH: ICD-10-CM

## 2023-03-24 DIAGNOSIS — R05.3 CHRONIC COUGH: Primary | ICD-10-CM

## 2023-03-24 RX ORDER — ALBUTEROL SULFATE 90 UG/1
2 AEROSOL, METERED RESPIRATORY (INHALATION) EVERY 6 HOURS PRN
Qty: 6.7 G | Refills: 5 | Status: SHIPPED | OUTPATIENT
Start: 2023-03-24

## 2023-03-24 RX ORDER — METHYLPREDNISOLONE 4 MG/1
TABLET ORAL
Qty: 21 EACH | Refills: 0 | Status: SHIPPED | OUTPATIENT
Start: 2023-03-24

## 2023-03-24 NOTE — PROGRESS NOTES
FAMILY PRACTICE OFFICE VISIT       NAME: Rakesh Friedman  AGE: 62 y o  SEX: female       : 1965        MRN: [id number]    DATE: 3/26/2023  TIME: 3:22 PM    Assessment and Plan   1  Chronic cough  -     methylPREDNISolone 4 MG tablet therapy pack; Use as directed on package  -     albuterol (Proventil HFA) 90 mcg/act inhaler; Inhale 2 puffs every 6 (six) hours as needed for wheezing  -     XR chest pa & lateral; Future; Expected date: 2023                 Chief Complaint     Chief Complaint   Patient presents with   • Follow-up     Post covid cough, chest congestion and sore throat       History of Present Illness   Rakesh Friedman is a 62y o -year-old female who is here for f/u to covid  Tested positive  for covid  Then took paxlovid for full course  Did test negative afterwards  Now worsening symptoms again with cough, congestion and sore throat      Review of Systems   Review of Systems   Constitutional: Positive for fatigue  Negative for fever  HENT: Positive for congestion, postnasal drip, rhinorrhea and sore throat  Negative for ear pain  Eyes: Negative for photophobia and visual disturbance  Respiratory: Positive for cough and chest tightness  Negative for shortness of breath and wheezing  Cardiovascular: Negative for chest pain and palpitations  Gastrointestinal: Negative for constipation, diarrhea, nausea and vomiting  Genitourinary: Negative for dysuria and frequency  Musculoskeletal: Negative for arthralgias and myalgias  Skin: Negative for rash  Neurological: Negative for dizziness, light-headedness and headaches  Hematological: Negative for adenopathy  Psychiatric/Behavioral: Negative for dysphoric mood and sleep disturbance  The patient is not nervous/anxious          Active Problem List     Patient Active Problem List   Diagnosis   • Encounter for screening mammogram for malignant neoplasm of breast   • Screening for human papillomavirus (HPV)   • Encounter for gynecological examination without abnormal finding   • Encounter for gynecological examination (general) (routine) without abnormal findings   • Screening for malignant neoplasm of breast   • Thyroid nodule   • Need for vaccination   • Generalized anxiety disorder   • Post-operative hypothyroidism   • Annual physical exam   • Encounter for lipid screening for cardiovascular disease   • Screening for diabetes mellitus   • PND (post-nasal drip)         Past Medical History:  Past Medical History:   Diagnosis Date   • Anxiety    • Chest pain     last assessed - 09Apr2015   • COVID-19 03/06/2023   • Disease of thyroid gland    • Headache    • Hypokalemia     last assessed - 09Apr2015   • PONV (postoperative nausea and vomiting)    • Shingles 07/2018       Past Surgical History:  Past Surgical History:   Procedure Laterality Date   • CHOLECYSTECTOMY     • COLONOSCOPY     • COLPOSCOPY     • GALLBLADDER SURGERY     • OTHER SURGICAL HISTORY      Contraceptives; last assessed - [id number]   • NJ TOTAL THYROID LOBECTOMY UNI W/WO ISTHMUSECTOMY Left 11/27/2019    Procedure: THYROID LOBECTOMY; POSSIBLE TOTAL THYROIDECTOMY;  Surgeon: Catrachita Cruz MD;  Location: [facility];  Service: ENT   • TOOTH EXTRACTION     • US GUIDED THYROID BIOPSY  10/30/2019       Family History:  Family History   Problem Relation Age of Onset   • Hypertension Mother    • Lymphoma Mother    • No Known Problems Father    • Cancer Brother 32        Adrenal   • Hypertension Family    • Irritable bowel syndrome Family    • Cancer Family         Intestinal Cancer   • Varicose Veins Family    • No Known Problems Sister    • No Known Problems Daughter    • No Known Problems Maternal Grandmother    • No Known Problems Maternal Grandfather    • No Known Problems Paternal Grandmother    • No Known Problems Paternal Grandfather    • No Known Problems Paternal Aunt    • No Known Problems Paternal Aunt        Social History:  Social History Socioeconomic History   • Marital status: /Civil Union     Spouse name: Not on file   • Number of children: Not on file   • Years of education: Not on file   • Highest education level: Not on file   Occupational History   • Not on file   Tobacco Use   • Smoking status: Former     Packs/day: 0 50     Years: 10 00     Pack years: 5 00     Types: Cigarettes     Quit date: 1999     Years since quittin 3     Passive exposure: Past   • Smokeless tobacco: Never   Vaping Use   • Vaping Use: Never used   Substance and Sexual Activity   • Alcohol use: Yes     Comment: social   • Drug use: No   • Sexual activity: Yes     Partners: Male     Birth control/protection: Post-menopausal   Other Topics Concern   • Not on file   Social History Narrative    Daily coffee consumption (___Cups/Day)    Exercise Frequency (times/Week)     Social Determinants of Health     Financial Resource Strain: Not on file   Food Insecurity: Not on file   Transportation Needs: Not on file   Physical Activity: Not on file   Stress: Not on file   Social Connections: Not on file   Intimate Partner Violence: Not on file   Housing Stability: Not on file       Objective     Vitals:    23 0757   BP: 136/82   Pulse: 59   Resp: 16   Temp: 98 1 °F (36 7 °C)   SpO2: 97%     Wt Readings from Last 3 Encounters:   23 74 8 kg (165 lb)   23 72 6 kg (160 lb)   22 74 2 kg (163 lb 9 6 oz)       Physical Exam  Vitals and nursing note reviewed  Constitutional:       Appearance: Normal appearance  HENT:      Head: Normocephalic and atraumatic  Right Ear: Tympanic membrane, ear canal and external ear normal       Left Ear: Tympanic membrane, ear canal and external ear normal       Nose: Nose normal       Mouth/Throat:      Pharynx: Oropharynx is clear  Eyes:      Extraocular Movements: Extraocular movements intact        Conjunctiva/sclera: Conjunctivae normal    Cardiovascular:      Rate and Rhythm: Normal rate and regular rhythm  Heart sounds: Normal heart sounds  Pulmonary:      Effort: Pulmonary effort is normal       Breath sounds: Normal breath sounds  Abdominal:      General: Bowel sounds are normal       Palpations: Abdomen is soft  Musculoskeletal:         General: Normal range of motion  Cervical back: Normal range of motion and neck supple  Skin:     General: Skin is warm  Capillary Refill: Capillary refill takes less than 2 seconds  Neurological:      General: No focal deficit present  Mental Status: She is alert and oriented to person, place, and time  Psychiatric:         Mood and Affect: Mood normal          Behavior: Behavior normal          Thought Content:  Thought content normal          Judgment: Judgment normal          Pertinent Laboratory/Diagnostic Studies:  Lab Results   Component Value Date    GLUCOSE 81 03/25/2015    BUN 17 11/24/2020    CREATININE 0 85 11/24/2020    CALCIUM 9 8 11/24/2020     04/28/2016    K 4 4 11/24/2020    CO2 30 11/24/2020     11/24/2020     Lab Results   Component Value Date    ALT 17 11/24/2020    AST 15 11/24/2020    ALKPHOS 31 (L) 11/24/2020    BILITOT 1 0 04/28/2016       Lab Results   Component Value Date    WBC 5 1 11/24/2020    HGB 13 8 11/24/2020    HCT 43 0 11/24/2020    MCV 90 9 11/24/2020     11/24/2020       Lab Results   Component Value Date    TSH 6 30 (H) 02/25/2020       Lab Results   Component Value Date    CHOL 185 04/28/2016     Lab Results   Component Value Date    TRIG 81 11/21/2022     Lab Results   Component Value Date    HDL 58 11/21/2022     Lab Results   Component Value Date    LDLCALC 99 11/21/2022     Lab Results   Component Value Date    HGBA1C 5 1 03/25/2015       Results for orders placed or performed in visit on 11/21/22   TSH, 3rd generation with Free T4 reflex   Result Value Ref Range    TSH 3RD GENERATON 1 470 0 450 - 4 500 uIU/mL   Lipid Panel with Direct LDL reflex   Result Value Ref Range Cholesterol 173 See Comment mg/dL    Triglycerides 81 See Comment mg/dL    HDL, Direct 58 >=50 mg/dL    LDL Calculated 99 0 - 100 mg/dL       Orders Placed This Encounter   Procedures   • XR chest pa & lateral       ALLERGIES:  Allergies   Allergen Reactions   • Amoxicillin Hives     Reaction Date: [id number];    • Codeine Nausea Only and Vomiting     Reaction Date: 05Jul2011;    • Molds & Smuts        Current Medications     Current Outpatient Medications   Medication Sig Dispense Refill   • albuterol (Proventil HFA) 90 mcg/act inhaler Inhale 2 puffs every 6 (six) hours as needed for wheezing 6 7 g 5   • Cholecalciferol (VITAMIN D3 PO) Take by mouth     • ibuprofen (MOTRIN) 600 mg tablet Take by mouth every 6 (six) hours as needed for mild pain     • levothyroxine 75 mcg tablet Take 1 tablet (75 mcg total) by mouth daily 90 tablet 3   • methylPREDNISolone 4 MG tablet therapy pack Use as directed on package 21 each 0   • montelukast (SINGULAIR) 10 mg tablet Take 10 mg by mouth daily at bedtime     • omega-3-acid ethyl esters (LOVAZA) 1 g capsule Take 2 g by mouth 2 (two) times a day     • fluocinolone (SYNALAR) 0 025 % ointment 2 (two) times a day as needed (Patient not taking: Reported on 3/7/2023)       No current facility-administered medications for this visit           Health Maintenance     Health Maintenance   Topic Date Due   • PT PLAN OF CARE  Never done   • COVID-19 Vaccine (4 - Booster for Moderna series) 02/14/2022   • HIV Screening  12/23/2023 (Originally 6/30/1980)   • BMI: Followup Plan  11/23/2023   • Annual Physical  11/23/2023   • Depression Screening  03/24/2024   • BMI: Adult  03/24/2024   • Breast Cancer Screening: Mammogram  08/27/2024   • Colorectal Cancer Screening  09/14/2025   • Cervical Cancer Screening  04/25/2027   • DTaP,Tdap,and Td Vaccines (3 - Td or Tdap) 09/23/2029   • Hepatitis C Screening  Completed   • Influenza Vaccine  Completed   • Pneumococcal Vaccine: Pediatrics (0 to 5 Years) and At-Risk Patients (6 to 59 Years)  Aged Out   • HIB Vaccine  Aged Out   • IPV Vaccine  Aged Out   • Hepatitis A Vaccine  Aged Out   • Meningococcal ACWY Vaccine  Aged Out   • HPV Vaccine  Aged [employer] History   Administered Date(s) Administered   • COVID-19 MODERNA VACC 0 5 ML IM 01/26/2021, 02/23/2021   • COVID-19 PFIZER VACCINE 0 3 ML IM 12/20/2021   • DTaP 5 11/05/2009   • H1N1, All Formulations 10/29/2009   • INFLUENZA 11/17/2010, 11/08/2022   • Influenza Quadrivalent Preservative Free 3 years and older IM 12/10/2015, 10/01/2016   • Influenza, injectable, quadrivalent, preservative free 0 5 mL 10/09/2018   • Influenza, recombinant, quadrivalent,injectable, preservative free 09/23/2019, 10/21/2020, 11/22/2021, 11/08/2022   • Tdap 09/23/2019   • Tuberculin Skin Test-PPD Intradermal 01/02/2009, 10/05/2020     BMI Counseling: Body mass index is 27 46 kg/m²  The BMI is above normal  Nutrition recommendations include decreasing portion sizes, encouraging healthy choices of fruits and vegetables, decreasing fast food intake, consuming healthier snacks, limiting drinks that contain sugar, moderation in carbohydrate intake, increasing intake of lean protein, reducing intake of saturated and trans fat and reducing intake of cholesterol  Exercise recommendations include moderate physical activity 150 minutes/week, exercising 3-5 times per week and strength training exercises  No pharmacotherapy was ordered  Rationale for BMI follow-up plan is due to patient being overweight or obese  Depression Screening and Follow-up Plan: Patient was screened for depression during today's encounter  They screened negative with a PHQ-2 score of 0          BETTY Sullivan

## 2023-05-08 ENCOUNTER — ANNUAL EXAM (OUTPATIENT)
Dept: OBGYN CLINIC | Facility: CLINIC | Age: 58
End: 2023-05-08

## 2023-05-08 VITALS
DIASTOLIC BLOOD PRESSURE: 72 MMHG | WEIGHT: 167.2 LBS | HEIGHT: 65 IN | BODY MASS INDEX: 27.86 KG/M2 | SYSTOLIC BLOOD PRESSURE: 110 MMHG

## 2023-05-08 DIAGNOSIS — Z12.31 ENCOUNTER FOR SCREENING MAMMOGRAM FOR MALIGNANT NEOPLASM OF BREAST: ICD-10-CM

## 2023-05-08 DIAGNOSIS — Z01.419 ENCNTR FOR GYN EXAM (GENERAL) (ROUTINE) W/O ABN FINDINGS: ICD-10-CM

## 2023-05-08 NOTE — PROGRESS NOTES
Fernando Pel   1965    CC:  Yearly exam    S:  62 y o  female here for yearly exam  She is postmenopausal and has had no vaginal bleeding  She denies vaginal discharge, itching, odor or dryness  Sexual activity: She is sexually active without pain, bleeding  She does note dryness with intercourse, does use lubricant which is sufficient  Has been working on her marriage and being more intentional - which helps  She did have some urinary incontinence with her recent COVID infection and coughing - but already improving,no other urinary concerns, bowel problems, breast concerns  Last Pap: 4/25/22 - NILM, Neg HPV   Last Mammo: 8/27/22 - BIRad 1  Last Colonoscopy: 9/14/15 -10yr recall     We reviewed ASCCP guidelines for Pap testing       Family hx of breast cancer: no  Family hx of ovarian cancer: no  Family hx of colon cancer: no      Current Outpatient Medications:   •  albuterol (Proventil HFA) 90 mcg/act inhaler, Inhale 2 puffs every 6 (six) hours as needed for wheezing, Disp: 6 7 g, Rfl: 5  •  Cholecalciferol (VITAMIN D3 PO), Take by mouth, Disp: , Rfl:   •  fluocinolone (SYNALAR) 0 025 % ointment, 2 (two) times a day as needed (Patient not taking: Reported on 3/7/2023), Disp: , Rfl:   •  ibuprofen (MOTRIN) 600 mg tablet, Take by mouth every 6 (six) hours as needed for mild pain, Disp: , Rfl:   •  levothyroxine 75 mcg tablet, Take 1 tablet (75 mcg total) by mouth daily, Disp: 90 tablet, Rfl: 3  •  methylPREDNISolone 4 MG tablet therapy pack, Use as directed on package, Disp: 21 each, Rfl: 0  •  montelukast (SINGULAIR) 10 mg tablet, Take 10 mg by mouth daily at bedtime, Disp: , Rfl:   •  omega-3-acid ethyl esters (LOVAZA) 1 g capsule, Take 2 g by mouth 2 (two) times a day, Disp: , Rfl:   Patient Active Problem List   Diagnosis   • Encounter for screening mammogram for malignant neoplasm of breast   • Screening for human papillomavirus (HPV)   • Encounter for gynecological examination without abnormal finding   • Encounter for gynecological examination (general) (routine) without abnormal findings   • Screening for malignant neoplasm of breast   • Thyroid nodule   • Need for vaccination   • Generalized anxiety disorder   • Post-operative hypothyroidism   • Annual physical exam   • Encounter for lipid screening for cardiovascular disease   • Screening for diabetes mellitus   • PND (post-nasal drip)     Family History   Problem Relation Age of Onset   • Hypertension Mother    • Lymphoma Mother    • No Known Problems Father    • Cancer Brother 32        Adrenal   • Hypertension Family    • Irritable bowel syndrome Family    • Cancer Family         Intestinal Cancer   • Varicose Veins Family    • No Known Problems Sister    • No Known Problems Daughter    • No Known Problems Maternal Grandmother    • No Known Problems Maternal Grandfather    • No Known Problems Paternal Grandmother    • No Known Problems Paternal Grandfather    • No Known Problems Paternal Aunt    • No Known Problems Paternal Aunt      Past Medical History:   Diagnosis Date   • Anxiety    • Chest pain     last assessed - 09Apr2015   • COVID-19 03/06/2023   • Disease of thyroid gland    • Headache    • Hypokalemia     last assessed - 09Apr2015   • PONV (postoperative nausea and vomiting)    • Shingles 07/2018        Review of Systems   Respiratory: Negative  Cardiovascular: Negative  Gastrointestinal: Negative for constipation and diarrhea  Genitourinary: Negative for difficulty urinating, pelvic pain, vaginal bleeding, vaginal discharge, itching or odor  O:  Last menstrual period 10/01/2020  Patient appears well and is not in distress  Breasts are symmetrical without mass, tenderness, nipple discharge, skin changes or adenopathy  Abdomen is soft and nontender without masses  External genitals are normal without lesions or rashes    Urethral meatus and urethra are normal  Bladder is normal to palpation  Vagina is normal without discharge or bleeding, generalized atrophic changes present   Cervix is normal without discharge or lesion  Uterus is normal, mobile, nontender without palpable mass  Adnexa are normal, nontender, without palpable mass  A:  Yearly exam      P:   Pap & HPV up to date  Mammo ordered   Colon Cancer Screening up to date     Reviewed considerations of menopause, to call with any postmenopausal bleeding or other concerns  RTO one year for yearly exam or sooner as needed

## 2023-05-22 ENCOUNTER — OFFICE VISIT (OUTPATIENT)
Dept: PHYSICAL THERAPY | Facility: REHABILITATION | Age: 58
End: 2023-05-22

## 2023-05-22 DIAGNOSIS — G51.8 CRANIOFACIAL PAIN SYNDROME: Primary | ICD-10-CM

## 2023-05-22 NOTE — PROGRESS NOTES
PT Evaluation     Today's date: 2023  Patient name: Regina Scanlon  : 1965  MRN: [id number]  Referring provider: Hermelinda Doyle PT  Dx:   Encounter Diagnosis     ICD-10-CM    1  Craniofacial pain syndrome  G51 8           Start Time: 1445  Stop Time: 1530  Total time in clinic (min): 45 minutes    Assessment  Assessment details: Regina Scanlon is a 62 y o  female presenting to outpatient physical therapy on 23 with referral from [facility] for chronic neck pain as well as jaw pain on the left side  MSI consistent with CS ERS R  Upon evaluation, Dexter Samuel demonstrates impaired CS mobility, muscular deficits in pterygoid muscles, postural deficits  The listed impairments and functional limitation are effecting Dexter Samuel ability to function at prior level  They can continue to benefit from physical therapy services at this times in order to address the above discussed impairments and functional limitation in order to allow for a return to premorbid status      HEP: SNAG - rotation R, RET    Impairments: abnormal muscle firing, abnormal muscle tone, abnormal or restricted ROM, abnormal movement, activity intolerance, impaired physical strength and pain with function  Understanding of Dx/Px/POC: good   Prognosis: good    Plan  Patient would benefit from: skilled PT  Planned modality interventions: thermotherapy: hydrocollator packs  Planned therapy interventions: joint mobilization, manual therapy, ADL training, neuromuscular re-education, home exercise program, therapeutic exercise, therapeutic activities, strengthening, patient education and functional ROM exercises  Frequency: 2x week  Duration in weeks: 4  Plan of Care beginning date: 2023  Plan of Care expiration date: 2023  Treatment plan discussed with: patient        Subjective Evaluation    History of Present Illness  Mechanism of injury: Dexter Samuel is a 62 y o  female presenting to physical therapy on 23 with referral from [facility] for chronic jaw "and neck pain  Patient was seen in PT roughly 1 year ago for similar pain  Patient works a dest job, 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     Feels like she has a \"lemon\" as she point to her TMJ  Fullness in left ear - more pain jaw on L, crunching in jaw as well  More pain on the left side of her neck but will get b/l nec pain    Pain chewing, eating, talking when it does worsen  Pain with head movements as her day goes along  C/O headaches, will be behind her L eye as well as around her head - not anything new or sudden  Denies any recent head/neck trauma    States that she had felt good until the end of  and the start of winter  Held up better through the estling season with prolonged sitting then she thought  Pain  Current pain ratin  At worst pain ratin  Location: see above      Diagnostic Tests  No diagnostic tests performed  Treatments  Previous treatment: physical therapy  Patient Goals  Patient goals for therapy: decreased pain  Patient goal: reduce h/a, reduce jaw pain    Short Term Goals:   1  Patient will be Independent with hep  2  Patient will improve pain with activity by 50%  3  Patient will have pain free/no clicking with jaw opening      Long Term Goals:   1  Patient will improve FOTO to greater then goal  2  Patient will improve pain with activity to 2/10 or less  3  Patient will continue with HEP independence to allow for decreased future reoccurrence of pain and loss in function  4  Patient will report resolved h/a for 1 weeks time  5  Patient will report resolved ear fullness on R        Objective     Posture: flexed upper TS    Dermatome: (pinprick- L/R): normal UE          Reflexes:  (L/R) C5-6: 2+ b/l   C5-6: 2+ b/l          Ligamentous Testing:  Alar Ligament: intact  Transverse Ligament: intact      Palpation: TTP b/l lateral pterygoid - L > R, claude masseter, increased tone R SCM     Cervical  % of normal   Flex  76   Extn   100 " "  SB Left -   SB Right -   ROT Left 100   ROT Right 75           MMT         AROM          PROM    Shoulder       L       R        L           R      L     R   Flex  Abd  IR          ER  Upper Trap         Mid Trap         Low Trap         Serratus              To test scap MMT at follow ups        Supine DNF endurance: to test NV    Jaw:  Openin mm - no deviation noted      Segmental mobility:   OAJ= hypomobile L opening    AAJ=  Hypomobile C1-2 L rot   Mid CS=   Hypomobile SG throughout CS into R SG               Precautions: hypothyroidism       Manuals             C1-2 rot mob AB G5            L OAJ mob - dist AB G5                                      Neuro Re-Ed                                                                                                        Ther Ex             RET 10x  3x with 20\" hold (Increaased scap R)            SNAG - R rot 6x                                                                                          Ther Activity                                       Gait Training                                       Modalities                                          "

## 2023-05-31 ENCOUNTER — OFFICE VISIT (OUTPATIENT)
Dept: PHYSICAL THERAPY | Facility: REHABILITATION | Age: 58
End: 2023-05-31

## 2023-05-31 DIAGNOSIS — G51.8 CRANIOFACIAL PAIN SYNDROME: Primary | ICD-10-CM

## 2023-05-31 NOTE — PROGRESS NOTES
"Daily Note     Today's date: 2023  Patient name: Mile Salmon  : 1965  MRN: [id number]  Referring provider: Anna Fierro, PT  Dx:   Encounter Diagnosis     ICD-10-CM    1  Craniofacial pain syndrome  G51 8           Start Time: 1350  Stop Time: 1430  Total time in clinic (min): 40 minutes    Subjective: Patient reports feeling better overall, was sore in her neck after last session but that then improved after a day or so  Still having a \"lemon\" feeling in her TMJ  Objective: See treatment diary below  Normal OAJ mobility  Normal C1-2 mobility  Hypomobile C2-3 L SG    Assessment: Tolerated treatment well  Patient reports improved  pain with R rotation follow MT  Weakness noted with DNF testing as I will plan to work on this in f/u appointments  Educated on continued HEP  Plan: Continue per plan of care        Precautions: hypothyroidism       Manuals            C1-2 rot mob AB G5            L OAJ mob - dist AB G5            C2-3 distraction   R - G5           STM b/l pterygoid  lateral - 3 bouts ea side           Laser TMJ  5' - protocol           Neuro Re-Ed             DNF  5\"x10           ismoetric rot  5\"x5 ea side                                                                            Ther Ex             RET 10x  3x with 20\" hold (Increaased scap R)            SNAG - R rot 6x review                                                                                         Ther Activity                                       Gait Training                                       Modalities                                            "

## 2023-06-02 ENCOUNTER — OFFICE VISIT (OUTPATIENT)
Dept: PHYSICAL THERAPY | Facility: REHABILITATION | Age: 58
End: 2023-06-02

## 2023-06-02 DIAGNOSIS — G51.8 CRANIOFACIAL PAIN SYNDROME: Primary | ICD-10-CM

## 2023-06-02 NOTE — PROGRESS NOTES
"Daily Note     Today's date: 2023  Patient name: Leelee Carrasquillo  : 1965  MRN: [id number]  Referring provider: Marcela Miller, PT  Dx:   Encounter Diagnosis     ICD-10-CM    1  Craniofacial pain syndrome  G51 8           Start Time: 1245  Stop Time: 1330  Total time in clinic (min): 45 minutes    Subjective: Patient reports that she continues to feel improvement  Less sensation in TMJ today  Some tightness in neck when turning R       Objective: See treatment diary below  Hypomobile C2-3  Normal segmantal mobility aside from noted above  TTP levator scap R, depressed and downwardly rotated R   Intact alar and transverse lig - asymptomatic     Assessment: Tolerated treatment well  Discussed continued HEP  Plan: Continue per plan of care        Precautions: hypothyroidism       Manuals           C1-2 rot mob AB G5            TS/CTJ PA mob   G5 upper and mid           L OAJ mob - dist AB G5            C2-3 distraction   R - G5 R G5          STM b/l pterygoid  lateral - 3 bouts ea side lateral - 3 bouts ea side          Laser TMJ  5' - protocol NP          Neuro Re-Ed             DNF  5\"x10 5\"x10          ismoetric rot  5\"x5 ea side 5\"x5 ea side          UT will slides with DNF cues   5\"x10                                                              Ther Ex             RET 10x  3x with 20\" hold (Increaased scap R)            SNAG - R rot 6x review                                                                                         Ther Activity                                       Gait Training                                       Modalities                                            "

## 2023-06-05 ENCOUNTER — OFFICE VISIT (OUTPATIENT)
Dept: PHYSICAL THERAPY | Facility: REHABILITATION | Age: 58
End: 2023-06-05
Payer: COMMERCIAL

## 2023-06-05 DIAGNOSIS — G51.8 CRANIOFACIAL PAIN SYNDROME: Primary | ICD-10-CM

## 2023-06-05 PROCEDURE — 97112 NEUROMUSCULAR REEDUCATION: CPT | Performed by: PHYSICAL THERAPIST

## 2023-06-05 PROCEDURE — 97140 MANUAL THERAPY 1/> REGIONS: CPT | Performed by: PHYSICAL THERAPIST

## 2023-06-05 NOTE — PROGRESS NOTES
"Daily Note     Today's date: 2023  Patient name: Mikey Rowe  : 1965  MRN: [id number]  Referring provider: Lashonda Parker PT  Dx:   Encounter Diagnosis     ICD-10-CM    1  Craniofacial pain syndrome  G51 8           Start Time: 1135  Stop Time: 1230  Total time in clinic (min): 55 minutes    Subjective: Patient reports that her neck overall has been feeling better as she is getting less pain and stiffness  Objective: See treatment diary below  Normal upper CS mobility testing  Tone increased R levator scap       Assessment: Tolerated treatment well  HEP updated with biting exercises and self assessment of \"lemon\" feeling in TMJ  Also provided updated with OH LT pull aparts due to ABD lower board of scap L>R with OH movement as well as decreased resting scapular posture (depression)  Plan: Continue per plan of care        Precautions: hypothyroidism       Manuals          C1-2 rot mob AB G5            assessment    5'         TS/CTJ PA mob   G5 upper and mid           L OAJ mob - dist AB G5            C2-3 distraction   R - G5 R G5          STM R levator scap    AB         STM b/l pterygoid  lateral - 3 bouts ea side lateral - 3 bouts ea side lateral - 3 bouts ea side         Laser TMJ  5' - protocol NP 5'         Neuro Re-Ed             DNF  5\"x10 5\"x10 5\"x10         ismoetric rot  5\"x5 ea side 5\"x5 ea side 5\"x5 ea side         UT will slides with DNF cues   5\"x10 10\"x5         OH LT pull apart    GTB  5\"x5  2 sets                                                Ther Ex             RET 10x  3x with 20\" hold (Increaased scap R)            SNAG - R rot 6x review                                                                                         Ther Activity                                       Gait Training                                       Modalities                                            "

## 2023-06-12 ENCOUNTER — OFFICE VISIT (OUTPATIENT)
Dept: PHYSICAL THERAPY | Facility: REHABILITATION | Age: 58
End: 2023-06-12
Payer: COMMERCIAL

## 2023-06-12 DIAGNOSIS — G51.8 CRANIOFACIAL PAIN SYNDROME: Primary | ICD-10-CM

## 2023-06-12 PROCEDURE — 97140 MANUAL THERAPY 1/> REGIONS: CPT | Performed by: PHYSICAL THERAPIST

## 2023-06-12 PROCEDURE — 97112 NEUROMUSCULAR REEDUCATION: CPT | Performed by: PHYSICAL THERAPIST

## 2023-06-12 NOTE — PROGRESS NOTES
"Daily Note     Today's date: 2023  Patient name: Fatuma Villaseñor  : 1965  MRN: [id number]  Referring provider: Bayron Momin PT  Dx:   Encounter Diagnosis     ICD-10-CM    1  Craniofacial pain syndrome  G51 8           Start Time:   Stop Time: 161  Total time in clinic (min): 40 minutes    Subjective: Patient reports that she feels some neck stiffness today but jaw has been feeling good  Objective: See treatment diary below  Normal upper CS mobility  Hypomobile T1-3    Assessment: Tolerated treatment well  HEP updated with UT raises and OH LT pull apart  Plan: Continue per plan of care        Precautions: hypothyroidism       Manuals         C1-2 rot mob AB G5            assessment    5' 5'        TS/CTJ PA mob   G5 upper and mid   G5        L OAJ mob - dist AB G5            C2-3 distraction   R - G5 R G5          STM R levator scap    AB AB        STM b/l pterygoid  lateral - 3 bouts ea side lateral - 3 bouts ea side lateral - 3 bouts ea side         Laser TMJ  5' - protocol NP 5'         Neuro Re-Ed            DNF  5\"x10 5\"x10 5\"x10         ismoetric rot  5\"x5 ea side 5\"x5 ea side 5\"x5 ea side         UT will slides with DNF cues   5\"x10 10\"x5         OH LT pull apart    GTB  5\"x5  2 sets GTB  5\"x5  3 sets        UT lifts     10\"x5  8#  5\"x5  3 sets                                  Ther Ex             RET 10x  3x with 20\" hold (Increaased scap R)            SNAG - R rot 6x review                                                                                         Ther Activity                                       Gait Training                                       Modalities                                            "

## 2023-06-15 ENCOUNTER — OFFICE VISIT (OUTPATIENT)
Dept: PHYSICAL THERAPY | Facility: REHABILITATION | Age: 58
End: 2023-06-15
Payer: COMMERCIAL

## 2023-06-15 DIAGNOSIS — G51.8 CRANIOFACIAL PAIN SYNDROME: Primary | ICD-10-CM

## 2023-06-15 PROCEDURE — 97140 MANUAL THERAPY 1/> REGIONS: CPT | Performed by: PHYSICAL THERAPIST

## 2023-06-15 PROCEDURE — 97112 NEUROMUSCULAR REEDUCATION: CPT | Performed by: PHYSICAL THERAPIST

## 2023-06-15 NOTE — PROGRESS NOTES
"Daily Note     Today's date: 6/15/2023  Patient name: Chantel Moe  : 1965  MRN: [id number]  Referring provider: Kalpana Dinero, PT  Dx:   Encounter Diagnosis     ICD-10-CM    1  Craniofacial pain syndrome  G51 8           Start Time: 1500  Stop Time: 1545  Total time in clinic (min): 45 minutes    Subjective: Patient reports feeling muscle soreness from added exercises but nothing bad  States that she also is not having a click with R rot  Jaw feeling less \"lemon like\"      Objective: See treatment diary below      Assessment: Tolerated treatment well  No HEP updates at this time  Had good neck control with DNF training, no cues needed for rotation correction as she needed in prior sessions  Upper CS remains with normal mobility, C4-5 hypoombile  Progressing well  Plan: Continue per plan of care        Precautions: hypothyroidism       Manuals 5/22 5/31 6/2 6/5 6/12 6/15       C1-2 rot mob AB G5            assessment    5' 5' 5'       TS/CTJ PA mob   G5 upper and mid   G5        L OAJ mob - dist AB G5            C2-3 distraction   R - G5 R G5          STM R levator scap    AB AB AB       C4-5 L lateral glide      G4       STM b/l pterygoid  lateral - 3 bouts ea side lateral - 3 bouts ea side lateral - 3 bouts ea side         Laser TMJ  5' - protocol NP 5'         Neuro Re-Ed    6/5 6/12 6/15       DNF  5\"x10 5\"x10 5\"x10  7\"x5       ismoetric rot  5\"x5 ea side 5\"x5 ea side 5\"x5 ea side  5\"x5 ea side       UT will slides with DNF cues   5\"x10 10\"x5         OH LT pull apart    GTB  5\"x5  2 sets GTB  5\"x5  3 sets        UT lifts     10\"x5  8#  5\"x5  3 sets        redcord fwd lean with neck control focus      10x b/l  5x ea side ul                    Ther Ex             RET 10x  3x with 20\" hold (Increaased scap R)            SNAG - R rot 6x review                                                                                         Ther Activity                                       Gait " Training                                       Modalities

## 2023-06-19 ENCOUNTER — OFFICE VISIT (OUTPATIENT)
Dept: PHYSICAL THERAPY | Facility: REHABILITATION | Age: 58
End: 2023-06-19
Payer: COMMERCIAL

## 2023-06-19 DIAGNOSIS — G51.8 CRANIOFACIAL PAIN SYNDROME: Primary | ICD-10-CM

## 2023-06-19 PROCEDURE — 97112 NEUROMUSCULAR REEDUCATION: CPT | Performed by: PHYSICAL THERAPIST

## 2023-06-19 PROCEDURE — 97140 MANUAL THERAPY 1/> REGIONS: CPT | Performed by: PHYSICAL THERAPIST

## 2023-06-19 NOTE — PROGRESS NOTES
"Daily Note     Today's date: 2023  Patient name: Celine Ruiz  : 1965  MRN: [id number]  Referring provider: Tera Rapp PT  Dx:   Encounter Diagnosis     ICD-10-CM    1  Craniofacial pain syndrome  G51 8           Start Time: 1420  Stop Time: 1510  Total time in clinic (min): 50 minutes    Subjective: Patient reports that she overall has been feeling good  Had a h/a since last session but feels that may be air quality vs neck  First h/a since starting PT that was reported  Jaw feeling good, only time felt is on L if she bites down hard on something (on R side) or if she bites and pulls as when eating a sandwich  Objective: See treatment diary below      Assessment: Tolerated treatment well  Patient with good upper CS mobility, mild hypomobility noted C3-4  Hypomobile T2-3 that was improve with G5 mob to TS  HEP reviewed with CS strengthening, scap strengthening and CS mobility exercises as well as jaw exercises for mobility  Plan: Continue per plan of care   Likely DC NV     Precautions: hypothyroidism       Manuals 5/22 5/31 6/2 6/5 6/12 6/15 6/19      C1-2 rot mob AB G5            assessment    5' 5' 5' 5'      TS/CTJ PA mob   G5 upper and mid   G5  G5  t1-3      L OAJ mob - dist AB G5            C2-3 distraction   R - G5 R G5          STM R levator scap    AB AB AB       C4-5 L lateral glide      G4 C3-4 rot (b/l      STM b/l pterygoid  lateral - 3 bouts ea side lateral - 3 bouts ea side lateral - 3 bouts ea side         Laser TMJ  5' - protocol NP 5'         Neuro Re-Ed    6/5 6/12 6/15 6/19      DNF  5\"x10 5\"x10 5\"x10  7\"x5 7\"x10      ismoetric rot  5\"x5 ea side 5\"x5 ea side 5\"x5 ea side  5\"x5 ea side 5\"x5 ea side      UT will slides with DNF cues   5\"x10 10\"x5         OH LT pull apart    GTB  5\"x5  2 sets GTB  5\"x5  3 sets        UT lifts     10\"x5  8#  5\"x5  3 sets        redcord fwd lean with neck control focus      10x b/l  5x ea side ul NP      HEP education        10'    " "  Ther Ex  5/31           RET 10x  3x with 20\" hold (Increaased scap R)            SNAG - R rot 6x review                                                                                         Ther Activity                                       Gait Training                                       Modalities                                            "

## 2023-06-22 ENCOUNTER — OFFICE VISIT (OUTPATIENT)
Dept: PHYSICAL THERAPY | Facility: REHABILITATION | Age: 58
End: 2023-06-22
Payer: COMMERCIAL

## 2023-06-22 DIAGNOSIS — G51.8 CRANIOFACIAL PAIN SYNDROME: Primary | ICD-10-CM

## 2023-06-22 PROCEDURE — 97140 MANUAL THERAPY 1/> REGIONS: CPT | Performed by: PHYSICAL THERAPIST

## 2023-06-22 PROCEDURE — 97112 NEUROMUSCULAR REEDUCATION: CPT | Performed by: PHYSICAL THERAPIST

## 2023-06-22 NOTE — PROGRESS NOTES
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  H/A described lateral Congregational region, temporal and to top of head  Objective: See treatment diary below  Hypomobile C2-3 into L SG  Normal C1-2 and OA mobility  TTP sub occipitals  Sustained RET: Decreased, NB (returned once sitting)  CS cleared prior to manipulation    Assessment: Tolerated treatment well  Relief with SOR release as she reported resolved h/a post session  Patient was shown how to perform this at home for self management  Plan: Continue per plan of care        Precautions: hypothyroidism       Manuals 5/22 5/31 6/2 6/5 6/12 6/15 6/19 6/22     C1-2 rot mob AB G5            assessment    5' 5' 5' 5' 5'     TS/CTJ PA mob   G5 upper and mid   G5  G5  t1-3      OAJ distraction        B/L - AB     SOR        AB     L OAJ mob - dist AB G5            C2-3 distraction   R - G5 R G5     G5 on R     STM R levator scap    AB AB AB       C4-5 L lateral glide      G4 C3-4 rot (b/l      STM b/l pterygoid  lateral - 3 bouts ea side lateral - 3 bouts ea side lateral - 3 bouts ea side         Laser TMJ  5' - protocol NP 5'         Neuro Re-Ed    6/5 6/12 6/15 6/19 6/22     DNF  5\"x10 5\"x10 5\"x10  7\"x5 7\"x10      ismoetric rot  5\"x5 ea side 5\"x5 ea side 5\"x5 ea side  5\"x5 ea side 5\"x5 ea side      UT will slides with DNF cues   5\"x10 10\"x5         OH LT pull apart    GTB  5\"x5  2 sets GTB  5\"x5  3 sets        UT lifts     10\"x5  8#  5\"x5  3 sets        redcord fwd lean with neck control focus      10x b/l  5x ea side ul NP      HEP education        10' 10'     Ther Ex             RET 10x  3x with 20\" hold (Increaased scap " R)            SNAG - R rot 6x review                                                                                         Ther Activity                                       Gait Training                                       Modalities

## 2023-08-16 ENCOUNTER — OFFICE VISIT (OUTPATIENT)
Dept: OBGYN CLINIC | Facility: CLINIC | Age: 58
End: 2023-08-16
Payer: COMMERCIAL

## 2023-08-16 DIAGNOSIS — R53.82 CHRONIC FATIGUE: ICD-10-CM

## 2023-08-16 DIAGNOSIS — R68.82 LOW LIBIDO: Primary | ICD-10-CM

## 2023-08-16 DIAGNOSIS — F41.9 ANXIETY: ICD-10-CM

## 2023-08-16 DIAGNOSIS — R63.5 WEIGHT GAIN: ICD-10-CM

## 2023-08-16 PROCEDURE — 99215 OFFICE O/P EST HI 40 MIN: CPT | Performed by: OBSTETRICS & GYNECOLOGY

## 2023-08-17 PROBLEM — R09.82 PND (POST-NASAL DRIP): Status: RESOLVED | Noted: 2020-11-24 | Resolved: 2023-08-17

## 2023-08-17 NOTE — PROGRESS NOTES
Assessment/Plan:       Diagnoses and all orders for this visit:    Low libido  -     DHEA-sulfate; Future  -     Testosterone; Future    Weight gain  -     Lipid Panel with Triglycerides/HDL-Cholesterol; Future    Chronic fatigue  -     Comprehensive metabolic panel; Future  -     CBC and Platelet; Future  -     T3, free; Future    Anxiety        1) This was a lengthy visit spent discussing the HPATG (hypothalamus/pituitary/adrenal/thyroid/gonadal) axis and the impact that hormonal deviation in one gland can have on another. It is not uncommon for ovarian declined to coincide or invoke thyroid and adrenal dysfunction as well. 2) Recommend testing of ovarian/thyroid/adrenal axis along with 24 hr salivary cortisol through [facility]. Normal screening labs also added on pending visit with PCP for her to review  3) Follow up OV in 3-4 weeks to review results in detail and determine hormonal basis for symptoms and offer treatment solutions  4) I reminded her that testosterone is THE libido hormone, likely suppressed with chronic adrenal stressors. Testosterone supplementation discussed in detail, including lack of FDA approval for women and cost concerns, as insurance will not reimburse. Side effects include: increased libido, increased muscle mass, decreased fat mass, erythrocytosis ( NOT polycythemia rubra), increased energy, acne, increased hair growth or loss. Will discuss in more detail if appropriate at follow up appt. 5) She MUST get out and exercise or walk at least 3 days weekly. Consider health  to help with this. 6) Follow up in one month    This was a 60 minute visit with greater than 50% of time spent in face to face counseling and coordination of care. Subjective:      Patient ID: Minda Jean Baptiste is a 62 y.o. female. [place] presents for hormonal consult, her first visit with me; referred by friend Leroy Christopher, sees Dr. Kimberley Heaton with [facility] for gyn care.    [place] has been menopausal for over 3 years. Complains of:  1) Anxiety; this has been a chronic issue for years, but actually has stabilized with medication and therapy. Wonders if there is a hormonal component to this? 2) Low libido  3) Chronic fatigue, low motivation for getting things done. Hard to find murray  4) Mild weight gain. Admits to having a horrible diet, with a sugar addiction that she recognizes is her way of self medicating her feelings. Does not enjoy exercise, walking is too much effort. [address]: as above; hx of hypothyroidism well managed; hx of reactive airway and allergy. 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 FHX: Mom with lymphoma but alive and well. MGF Stomach CA; MGM kidney Ca. Dad alive and well. PGP unknown. 2 siblings, brother  of adrenal cell Ca age 28. 2 kids, 12 and 15, healthy          Review of Systems   Constitutional: Positive for fatigue and unexpected weight change. Negative for activity change and appetite change. Gastrointestinal: Negative. Endocrine: Negative. Genitourinary:        Low libido   Musculoskeletal: Negative. Neurological: Negative. Psychiatric/Behavioral: Positive for decreased concentration, dysphoric mood and sleep disturbance. The patient is nervous/anxious. Objective:      LMP 10/01/2020 (Exact Date)          Physical Exam  Constitutional:       Appearance: Normal appearance. Neurological:      Mental Status: She is alert.

## 2023-08-24 ENCOUNTER — APPOINTMENT (OUTPATIENT)
Dept: LAB | Facility: CLINIC | Age: 58
End: 2023-08-24
Payer: COMMERCIAL

## 2023-08-24 DIAGNOSIS — R63.5 WEIGHT GAIN: ICD-10-CM

## 2023-08-24 DIAGNOSIS — E89.0 POST-OPERATIVE HYPOTHYROIDISM: ICD-10-CM

## 2023-08-24 DIAGNOSIS — R68.82 LOW LIBIDO: ICD-10-CM

## 2023-08-24 DIAGNOSIS — Z13.0 SCREENING, ANEMIA, DEFICIENCY, IRON: ICD-10-CM

## 2023-08-24 DIAGNOSIS — R53.82 CHRONIC FATIGUE: ICD-10-CM

## 2023-08-24 LAB
ALBUMIN SERPL BCP-MCNC: 4.1 G/DL (ref 3.5–5)
ALP SERPL-CCNC: 37 U/L (ref 34–104)
ALT SERPL W P-5'-P-CCNC: 18 U/L (ref 7–52)
ANION GAP SERPL CALCULATED.3IONS-SCNC: 8 MMOL/L
AST SERPL W P-5'-P-CCNC: 19 U/L (ref 13–39)
BASOPHILS # BLD AUTO: 0.05 THOUSANDS/ÂΜL (ref 0–0.1)
BASOPHILS NFR BLD AUTO: 1 % (ref 0–1)
BILIRUB SERPL-MCNC: 0.64 MG/DL (ref 0.2–1)
BUN SERPL-MCNC: 20 MG/DL (ref 5–25)
CALCIUM SERPL-MCNC: 9.7 MG/DL (ref 8.4–10.2)
CHLORIDE SERPL-SCNC: 104 MMOL/L (ref 96–108)
CHOLEST SERPL-MCNC: 203 MG/DL
CO2 SERPL-SCNC: 29 MMOL/L (ref 21–32)
CREAT SERPL-MCNC: 0.82 MG/DL (ref 0.6–1.3)
EOSINOPHIL # BLD AUTO: 0.15 THOUSAND/ÂΜL (ref 0–0.61)
EOSINOPHIL NFR BLD AUTO: 3 % (ref 0–6)
ERYTHROCYTE [DISTWIDTH] IN BLOOD BY AUTOMATED COUNT: 13.1 % (ref 11.6–15.1)
GFR SERPL CREATININE-BSD FRML MDRD: 79 ML/MIN/1.73SQ M
GLUCOSE P FAST SERPL-MCNC: 79 MG/DL (ref 65–99)
HCT VFR BLD AUTO: 42.4 % (ref 34.8–46.1)
HDLC SERPL-MCNC: 57 MG/DL
HGB BLD-MCNC: 13.9 G/DL (ref 11.5–15.4)
IMM GRANULOCYTES # BLD AUTO: 0.02 THOUSAND/UL (ref 0–0.2)
IMM GRANULOCYTES NFR BLD AUTO: 0 % (ref 0–2)
LDLC SERPL CALC-MCNC: 127 MG/DL (ref 0–100)
LDLC SERPL DIRECT ASSAY-MCNC: 136 MG/DL (ref 0–100)
LYMPHOCYTES # BLD AUTO: 2.05 THOUSANDS/ÂΜL (ref 0.6–4.47)
LYMPHOCYTES NFR BLD AUTO: 36 % (ref 14–44)
MCH RBC QN AUTO: 29.4 PG (ref 26.8–34.3)
MCHC RBC AUTO-ENTMCNC: 32.8 G/DL (ref 31.4–37.4)
MCV RBC AUTO: 90 FL (ref 82–98)
MONOCYTES # BLD AUTO: 0.39 THOUSAND/ÂΜL (ref 0.17–1.22)
MONOCYTES NFR BLD AUTO: 7 % (ref 4–12)
NEUTROPHILS # BLD AUTO: 3.1 THOUSANDS/ÂΜL (ref 1.85–7.62)
NEUTS SEG NFR BLD AUTO: 53 % (ref 43–75)
NONHDLC SERPL-MCNC: 146 MG/DL
NRBC BLD AUTO-RTO: 0 /100 WBCS
PLATELET # BLD AUTO: 300 THOUSANDS/UL (ref 149–390)
PMV BLD AUTO: 10.3 FL (ref 8.9–12.7)
POTASSIUM SERPL-SCNC: 4.3 MMOL/L (ref 3.5–5.3)
PROT SERPL-MCNC: 6.6 G/DL (ref 6.4–8.4)
RBC # BLD AUTO: 4.73 MILLION/UL (ref 3.81–5.12)
SODIUM SERPL-SCNC: 141 MMOL/L (ref 135–147)
T3FREE SERPL-MCNC: 3.3 PG/ML (ref 2.5–3.9)
TESTOST SERPL-MSCNC: 42 NG/DL
TRIGL SERPL-MCNC: 94 MG/DL
TSH SERPL DL<=0.05 MIU/L-ACNC: 2.6 UIU/ML (ref 0.45–4.5)
WBC # BLD AUTO: 5.76 THOUSAND/UL (ref 4.31–10.16)

## 2023-08-24 PROCEDURE — 82627 DEHYDROEPIANDROSTERONE: CPT

## 2023-08-24 PROCEDURE — 84443 ASSAY THYROID STIM HORMONE: CPT

## 2023-08-24 PROCEDURE — 80061 LIPID PANEL: CPT

## 2023-08-24 PROCEDURE — 36415 COLL VENOUS BLD VENIPUNCTURE: CPT

## 2023-08-24 PROCEDURE — 80053 COMPREHEN METABOLIC PANEL: CPT

## 2023-08-24 PROCEDURE — 84481 FREE ASSAY (FT-3): CPT

## 2023-08-24 PROCEDURE — 83721 ASSAY OF BLOOD LIPOPROTEIN: CPT

## 2023-08-24 PROCEDURE — 84403 ASSAY OF TOTAL TESTOSTERONE: CPT

## 2023-08-24 PROCEDURE — 85025 COMPLETE CBC W/AUTO DIFF WBC: CPT

## 2023-08-25 LAB — DHEA-S SERPL-MCNC: 163 UG/DL (ref 29.4–220.5)

## 2023-08-29 DIAGNOSIS — E03.9 HYPOTHYROIDISM, UNSPECIFIED TYPE: ICD-10-CM

## 2023-08-29 RX ORDER — LEVOTHYROXINE SODIUM 0.07 MG/1
75 TABLET ORAL DAILY
Qty: 90 TABLET | Refills: 3 | Status: SHIPPED | OUTPATIENT
Start: 2023-08-29

## 2023-09-19 ENCOUNTER — TELEMEDICINE (OUTPATIENT)
Dept: OBGYN CLINIC | Facility: CLINIC | Age: 58
End: 2023-09-19
Payer: COMMERCIAL

## 2023-09-19 DIAGNOSIS — R63.5 WEIGHT GAIN: Primary | ICD-10-CM

## 2023-09-19 DIAGNOSIS — L65.9 HAIR LOSS: ICD-10-CM

## 2023-09-19 DIAGNOSIS — R53.82 CHRONIC FATIGUE: ICD-10-CM

## 2023-09-19 DIAGNOSIS — R68.82 DECREASED LIBIDO: ICD-10-CM

## 2023-09-19 PROCEDURE — 99215 OFFICE O/P EST HI 40 MIN: CPT | Performed by: OBSTETRICS & GYNECOLOGY

## 2023-09-19 RX ORDER — SPIRONOLACTONE 50 MG/1
50 TABLET, FILM COATED ORAL DAILY
Qty: 30 TABLET | Refills: 5 | Status: SHIPPED | OUTPATIENT
Start: 2023-09-19

## 2023-09-20 NOTE — PROGRESS NOTES
Virtual Regular Visit    Verification of patient location:    Patient is located at Home in the following state in which I hold an active license [place]      Assessment/Plan:    Problem List Items Addressed This Visit    None  Visit Diagnoses     Weight gain    -  Primary    Chronic fatigue        Decreased libido        Hair loss        Relevant Medications    spironolactone (ALDACTONE) 50 mg tablet        1) Labs first reviewed, noting: a) normal thyroid axis; b) adrenal: excellent DHEAS and testosterone levels at 165 and 42 respectively. 24 hr saliva cortisol notes high normal morning value, high normal noon, very high off graph in late afternoon, normal value at night. Review of diet log reveals this to be consistent with late meal and moderate stress coming home from school in late afternoon and rushing at home. 2) Pillars of adrenal healing reviewed to help reduce cortisol:  A) Mental Health. I think she is more stressed from work/home life balance than she thinks. Denies the need for therapy, but will draw upon family for more help and support at home. Denies additional anti anxiety medication at this point. Stress Formula from [facility] recommended through [employer] for her to try. B) Sleep: this is when her adrenals heal! Sleep is actually great, supported by normal evening and morning values  C) Exercise: this is a must, prefer low impact, mindful activities including yoga/ pilates. Must carve out more time for herself if only 20 minutes with home video. D) Nutrition. She is actually doing a great job here. Remind to eat protein/fat with any carb, but prefer to eat smaller snacks throughout day rather than missing meals. 3) Ironically she is not symptomatic from ovarian decline at this point, hold on HRT for now. 4) While testosterone is great, she still would not mind a little boost for libido and energy.  Testosterone supplementation discussed in detail, including lack of FDA approval for women and cost concerns, as insurance will not reimburse. Side effects include: increased libido, increased muscle mass, decreased fat mass, erythrocytosis ( NOT polycythemia rubra), increased energy, acne, increased hair growth or loss. Will call in to [facility] testosterone cream 2 mg/ daily. Should only need this short term. 5) In light of already existing hair thinning, will add spironolactone 50 mg daily to this to protect against further loss. 6) Email me with progress report in a couple months as to how she is feeling. Follow up prn    This was a 60 minute visit with greater than 50% of time spent in face to face counseling and coordination of care       Reason for visit is   Chief Complaint   Patient presents with   • Virtual Regular Visit        Encounter provider Antoni Arenas MD    Provider located at [facility] [address] [place] [address]  [phone]      Recent Visits  No visits were found meeting these conditions. Showing recent visits within past 7 days and meeting all other requirements  Future Appointments  No visits were found meeting these conditions. Showing future appointments within next 150 days and meeting all other requirements       The patient was identified by name and date of birth. Jorge Payor was informed that this is a telemedicine visit and that the visit is being conducted through the [employer]. She agrees to proceed. .  My office door was closed. No one else was in the room. She acknowledged consent and understanding of privacy and security of the video platform. The patient has agreed to participate and understands they can discontinue the visit at any time. Patient is aware this is a billable service. Subjective        Aundra Liang returns for hormone consult follow up.  I saw her last month with complaints of: increased anxiety, low libido, chronic fatigue, weight gain with sugar cravings. She admits to more stressors starting new job at [employer] last year. I offered comprehensive testing of thyroid and adrenal axis along with 24 hr salivary cortisol testing, which we will be reviewing today. NO new health issues since last visit, is trying magnesium glycinate at night for sleep. Trying to cut back on sugars in diet with only mild benefit. Admits to not exercising well at all due to new job. Food log:   Brkfst: Coffee, green smoothie protein shake at 1000. Lunch: salad with lean protein, but does not eat until after 1:00pm, and often misses lunch altogether  Dinner around 1800, last night with brown rice, chicken, broccoli. Often with grab and go meals due to kids activities.         Past Medical History:   Diagnosis Date   • Anxiety    • Chest pain     last assessed - 09Apr2015   • COVID-19 03/06/2023   • Headache    • Hypokalemia     last assessed - 09Apr2015   • PONV (postoperative nausea and vomiting)    • Shingles 07/2018       Past Surgical History:   Procedure Laterality Date   • CHOLECYSTECTOMY     • COLONOSCOPY     • COLPOSCOPY     • GALLBLADDER SURGERY     • OTHER SURGICAL HISTORY      Contraceptives; last assessed - 11Jul2012   • NM TOTAL THYROID LOBECTOMY UNI W/WO ISTHMUSECTOMY Left 11/27/2019    Procedure: THYROID LOBECTOMY; POSSIBLE TOTAL THYROIDECTOMY;  Surgeon: Kelvin Olsen MD;  Location: [facility];  Service: ENT   • TOOTH EXTRACTION     • US GUIDED THYROID BIOPSY  10/30/2019       Current Outpatient Medications   Medication Sig Dispense Refill   • Cholecalciferol (VITAMIN D3 PO) Take by mouth     • fluocinolone (SYNALAR) 0.025 % ointment 2 (two) times a day as needed     • ibuprofen (MOTRIN) 600 mg tablet Take by mouth every 6 (six) hours as needed for mild pain     • levothyroxine 75 mcg tablet Take 1 tablet (75 mcg total) by mouth daily 90 tablet 3   • montelukast (SINGULAIR) 10 mg tablet Take 10 mg by mouth daily at bedtime • omega-3-acid ethyl esters (LOVAZA) 1 g capsule Take 2 g by mouth 2 (two) times a day       No current facility-administered medications for this visit. Allergies   Allergen Reactions   • Amoxicillin Hives     Reaction Date: [id number];    • Codeine Nausea Only and Vomiting     Reaction Date: 05Jul2011;    • Molds & Smuts        Review of Systems   Constitutional: Positive for fatigue and unexpected weight change. Negative for activity change and appetite change. Gastrointestinal: Negative. Endocrine: Negative for heat intolerance. Genitourinary:        Decreased libido   Musculoskeletal: Negative. Psychiatric/Behavioral: Positive for dysphoric mood. Negative for sleep disturbance. The patient is nervous/anxious. Video Exam    There were no vitals filed for this visit.     Physical Exam     Visit Time

## 2023-09-21 DIAGNOSIS — R68.82 DECREASED LIBIDO: Primary | ICD-10-CM

## 2023-11-01 ENCOUNTER — HOSPITAL ENCOUNTER (OUTPATIENT)
Dept: MAMMOGRAPHY | Facility: IMAGING CENTER | Age: 58
Discharge: HOME | End: 2023-11-01
Payer: COMMERCIAL

## 2023-11-01 VITALS — WEIGHT: 170 LBS | BODY MASS INDEX: 28.32 KG/M2 | HEIGHT: 65 IN

## 2023-11-01 DIAGNOSIS — Z12.31 ENCOUNTER FOR SCREENING MAMMOGRAM FOR MALIGNANT NEOPLASM OF BREAST: ICD-10-CM

## 2023-11-01 PROCEDURE — 77067 SCR MAMMO BI INCL CAD: CPT

## 2023-11-01 PROCEDURE — 77063 BREAST TOMOSYNTHESIS BI: CPT

## 2023-11-07 ENCOUNTER — TELEPHONE (OUTPATIENT)
Age: 58
End: 2023-11-07

## 2023-11-07 NOTE — TELEPHONE ENCOUNTER
----- Message from Goldy Desir sent at 11/6/2023 12:46 PM EST -----  Regarding: [facility] physical for [facility]: [phone]  Hi,  Sorry but not sure how to proceed… Jenny Pedro is coming in next Tues 11/14 so we can get her [facility] physical paperwork completed then. I tried to get [facility] done at [facility] now as his appt with you isn’t until January and his paperwork has to be in today (or before he begins official practice)  He had a grade 1 sprain and had PT and was cleared but they wouldn’t do his physical because they said you had to clear him as PCP. Should I try to make a [facility] physical appt (if you do them since insurance won’t pay for an annual yet) or bring him in with [id number]? I should have fibbed on his paperwork and checked “no” for sprains!!! Thoughts? He is fine and has been running and wrestling since completing PT 2 weeks ago   Thanks!

## 2023-11-28 ENCOUNTER — OFFICE VISIT (OUTPATIENT)
Dept: FAMILY MEDICINE CLINIC | Facility: CLINIC | Age: 58
End: 2023-11-28
Payer: COMMERCIAL

## 2023-11-28 VITALS
TEMPERATURE: 97 F | WEIGHT: 174.2 LBS | OXYGEN SATURATION: 97 % | BODY MASS INDEX: 28 KG/M2 | RESPIRATION RATE: 16 BRPM | HEIGHT: 66 IN | SYSTOLIC BLOOD PRESSURE: 124 MMHG | DIASTOLIC BLOOD PRESSURE: 80 MMHG | HEART RATE: 67 BPM

## 2023-11-28 DIAGNOSIS — Z23 ENCOUNTER FOR IMMUNIZATION: ICD-10-CM

## 2023-11-28 DIAGNOSIS — Z00.00 ANNUAL PHYSICAL EXAM: Primary | ICD-10-CM

## 2023-11-28 DIAGNOSIS — E89.0 POST-OPERATIVE HYPOTHYROIDISM: ICD-10-CM

## 2023-11-28 PROCEDURE — 90471 IMMUNIZATION ADMIN: CPT

## 2023-11-28 PROCEDURE — 99396 PREV VISIT EST AGE 40-64: CPT | Performed by: FAMILY MEDICINE

## 2023-11-28 PROCEDURE — 90686 IIV4 VACC NO PRSV 0.5 ML IM: CPT

## 2023-11-28 NOTE — PROGRESS NOTES
[facility] PRACTICE    NAME: Sloan Velasco  AGE: 62 y.o. SEX: female  : 1965     DATE: 2023     Assessment and Plan:     Problem List Items Addressed This Visit     Post-operative hypothyroidism    Relevant Orders    TSH, 3rd generation with Free T4 reflex    Annual physical exam - Primary     Flu shot today        Other Visit Diagnoses     Encounter for immunization        Relevant Orders    influenza vaccine, quadrivalent, 0.5 mL, preservative-free, for adult and pediatric patients 6 mos+ (AFLURIA, FLUARIX, FLULAVAL, FLUZONE) (Completed)          Immunizations and preventive care screenings were discussed with patient today. Appropriate education was printed on patient's after visit summary. Counseling:  Dental Health: discussed importance of regular tooth brushing, flossing, and dental visits. Depression Screening and Follow-up Plan: Patient was screened for depression during today's encounter. They screened negative with a PHQ-2 score of 0. Return in 1 year (on 2024) for Annual physical.     Chief Complaint:     Chief Complaint   Patient presents with   • Physical Exam     Patient being seen for physical       History of Present Illness:     Adult Annual Physical   Patient here for a comprehensive physical exam. The patient reports problems - some stressor triggering health issues . Diet and Physical Activity  Diet/Nutrition: well balanced diet. Exercise: walking. Depression Screening  PHQ-2/9 Depression Screening    Little interest or pleasure in doing things: 0 - not at all  Feeling down, depressed, or hopeless: 0 - not at all  PHQ-2 Score: 0  PHQ-2 Interpretation: Negative depression screen       General Health  Sleep: sleeps well. Hearing: normal - bilateral.  Vision: wears glasses. Dental: regular dental visits. /GYN Health  Follows with gynecology?  yes   Patient is: postmenopausal  Last menstrual period: n/a  Contraceptive method: menopause. Advanced Care Planning  Do you have an advanced directive? yes  Do you have a durable medical power of ? yes     Review of Systems:     Review of Systems   Constitutional: Negative. HENT: Negative. Eyes: Negative. Respiratory: Negative. Cardiovascular: Negative. Gastrointestinal: Negative. Endocrine: Negative. Genitourinary: Negative. Musculoskeletal: Negative. Allergic/Immunologic: Negative. Neurological: Negative. Hematological: Negative. Psychiatric/Behavioral: Negative.         Past Medical History:     Past Medical History:   Diagnosis Date   • Anxiety    • Basal cell carcinoma    • Chest pain     last assessed - 2015   • COVID-19 2023   • Headache    • Hypokalemia     last assessed - 2015   • PONV (postoperative nausea and vomiting)    • Shingles 2018      Past Surgical History:     Past Surgical History:   Procedure Laterality Date   • CHOLECYSTECTOMY     • COLONOSCOPY     • COLPOSCOPY     • GALLBLADDER SURGERY     • OTHER SURGICAL HISTORY      Contraceptives; last assessed - [id number]   • AR TOTAL THYROID LOBECTOMY UNI W/WO ISTHMUSECTOMY Left 2019    Procedure: THYROID LOBECTOMY; POSSIBLE TOTAL THYROIDECTOMY;  Surgeon: David Jara MD;  Location: [facility];  Service: ENT   • TOOTH EXTRACTION     • US GUIDED THYROID BIOPSY  10/30/2019      Social History:     Social History     Socioeconomic History   • Marital status: /Civil Union     Spouse name: None   • Number of children: None   • Years of education: None   • Highest education level: None   Occupational History   • None   Tobacco Use   • Smoking status: Former     Packs/day: 0.50     Years: 10.00     Total pack years: 5.00     Types: Cigarettes     Quit date: 1999     Years since quittin.0     Passive exposure: Past   • Smokeless tobacco: Never   Vaping Use   • Vaping Use: Never used Substance and Sexual Activity   • Alcohol use: Yes     Comment: social   • Drug use: No   • Sexual activity: Yes     Partners: Male     Birth control/protection: Post-menopausal   Other Topics Concern   • None   Social History Narrative    Daily coffee consumption (___Cups/Day)    Exercise Frequency (times/Week)     Social Determinants of Health     Financial Resource Strain: Not on file   Food Insecurity: Not on file   Transportation Needs: Not on file   Physical Activity: Not on file   Stress: Not on file   Social Connections: Not on file   Intimate Partner Violence: Not on file   Housing Stability: Not on file      Family History:     Family History   Problem Relation Age of Onset   • Hypertension Mother    • Lymphoma Mother 80   • Crohn's disease Mother    • No Known Problems Father    • No Known Problems Sister    • No Known Problems Daughter    • Kidney cancer Maternal Grandmother 80   • Cancer Maternal Grandfather 68        Unknown origin   • No Known Problems Paternal Grandmother    • No Known Problems Paternal Grandfather    • Cancer Brother 32        Adrenal   • No Known Problems Paternal Aunt    • No Known Problems Paternal Aunt    • Hypertension Family    • Irritable bowel syndrome Family    • Cancer Family         Intestinal Cancer   • Varicose Veins Family       Current Medications:     Current Outpatient Medications   Medication Sig Dispense Refill   • Cholecalciferol (VITAMIN D3 PO) Take by mouth     • Fexofenadine HCl (ALLEGRA PO) Take 1 tablet by mouth daily as needed     • ibuprofen (MOTRIN) 600 mg tablet Take by mouth every 6 (six) hours as needed for mild pain     • levothyroxine 75 mcg tablet Take 1 tablet (75 mcg total) by mouth daily 90 tablet 3   • omega-3-acid ethyl esters (LOVAZA) 1 g capsule Take 2 g by mouth 2 (two) times a day     • other medication, see sig, Medication/product name: Testosterone cream   Strength: 4mg/ml  Sig (include dose, route, frequency): apply 0.5ml to labia daily 15 mg 5   • spironolactone (ALDACTONE) 50 mg tablet Take 1 tablet (50 mg total) by mouth daily 30 tablet 5     No current facility-administered medications for this visit. Allergies: Allergies   Allergen Reactions   • Amoxicillin Hives     Reaction Date: [id number];    • Codeine Nausea Only and Vomiting     Reaction Date: 05Jul2011;    • Molds & Smuts       Physical Exam:     /80 (BP Location: Left arm, Patient Position: Sitting, Cuff Size: Standard)   Pulse 67   Temp (!) 97 °F (36.1 °C) (Tympanic)   Resp 16   Ht 5' 5.75" (1.67 m)   Wt 79 kg (174 lb 3.2 oz)   LMP 10/01/2020 (Exact Date)   SpO2 97%   BMI 28.33 kg/m²     Physical Exam  Vitals and nursing note reviewed. Constitutional:       Appearance: Normal appearance. She is well-developed. HENT:      Head: Normocephalic and atraumatic. Right Ear: External ear normal.      Left Ear: External ear normal.      Nose: Nose normal.   Eyes:      Extraocular Movements: Extraocular movements intact. Conjunctiva/sclera: Conjunctivae normal.      Pupils: Pupils are equal, round, and reactive to light. Cardiovascular:      Rate and Rhythm: Normal rate and regular rhythm. Heart sounds: Normal heart sounds. Pulmonary:      Effort: Pulmonary effort is normal.      Breath sounds: Normal breath sounds. Abdominal:      General: Abdomen is flat. Bowel sounds are normal.      Palpations: Abdomen is soft. Musculoskeletal:         General: Normal range of motion. Cervical back: Normal range of motion and neck supple. Skin:     General: Skin is warm and dry. Capillary Refill: Capillary refill takes less than 2 seconds. Neurological:      General: No focal deficit present. Mental Status: She is alert and oriented to person, place, and time. Psychiatric:         Mood and Affect: Mood normal.         Behavior: Behavior normal.         Thought Content:  Thought content normal.         Judgment: Judgment normal. Kassidy Goes, DO  [address]

## 2023-11-28 NOTE — PATIENT INSTRUCTIONS
Wellness Visit for Adults   AMBULATORY CARE:   A wellness visit  is when you see your healthcare provider to get screened for health problems. Your healthcare provider will also give you advice on how to stay healthy. Write down your questions so you remember to ask them. Ask your healthcare provider how often you should have a wellness visit. What happens at a wellness visit:  Your healthcare provider will ask about your health, and your family history of health problems. This includes high blood pressure, heart disease, and cancer. He or she will ask if you have symptoms that concern you, if you smoke, and about your mood. You may also be asked about your intake of medicines, supplements, food, and alcohol. Any of the following may be done: Your weight  will be checked. Your height may also be checked so your body mass index (BMI) can be calculated. Your BMI shows if you are at a healthy weight. Your blood pressure  and heart rate will be checked. Your temperature may also be checked. Blood and urine tests  may be done. Blood tests may be done to check your cholesterol levels. Abnormal cholesterol levels increase your risk for heart disease and stroke. You may also need a blood or urine test to check for diabetes if you are at increased risk. Urine tests may be done to look for signs of an infection or kidney disease. A physical exam  includes checking your heartbeat and lungs with a stethoscope. Your healthcare provider may also check your skin to look for sun damage. Screening tests  may be recommended. A screening test is done to check for diseases that may not cause symptoms. The screening tests you may need depend on your age, gender, family history, and lifestyle habits. For example, colorectal screening may be recommended if you are 48years old or older. Screening tests you need if you are a woman:   A Pap smear  is used to screen for cervical cancer.  Pap smears are usually done every 3 to 5 years depending on your age. You may need them more often if you have had abnormal Pap smear test results in the past. Ask your healthcare provider how often you should have a Pap smear. A mammogram  is an x-ray of your breasts to screen for breast cancer. Experts recommend mammograms every 2 years starting at age 48 years. You may need a mammogram at age 52 years or younger if you have an increased risk for breast cancer. Talk to your healthcare provider about when you should start having mammograms and how often you need them. Vaccines you may need:   Get an influenza vaccine  every year. The influenza vaccine protects you from the flu. Several types of viruses cause the flu. The viruses change over time, so new vaccines are made each year. Get a tetanus-diphtheria (Td) booster vaccine  every 10 years. This vaccine protects you against tetanus and diphtheria. Tetanus is a severe infection that may cause painful muscle spasms and lockjaw. Diphtheria is a severe bacterial infection that causes a thick covering in the back of your mouth and throat. Get a human papillomavirus (HPV) vaccine  if you are female and aged 23 to 32 or male 23 to 24 and never received it. This vaccine protects you from HPV infection. HPV is the most common infection spread by sexual contact. HPV may also cause vaginal, penile, and anal cancers. Get a pneumococcal vaccine  if you are aged 72 years or older. The pneumococcal vaccine is an injection given to protect you from pneumococcal disease. Pneumococcal disease is an infection caused by pneumococcal bacteria. The infection may cause pneumonia, meningitis, or an ear infection. Get a shingles vaccine  if you are 60 or older, even if you have had shingles before. The shingles vaccine is an injection to protect you from the varicella-zoster virus. This is the same virus that causes chickenpox.  Shingles is a painful rash that develops in people who had chickenpox or have been exposed to the virus. How to eat healthy:  My Plate is a model for planning healthy meals. It shows the types and amounts of foods that should go on your plate. Fruits and vegetables make up about half of your plate, and grains and protein make up the other half. A serving of dairy is included on the side of your plate. The amount of calories and serving sizes you need depends on your age, gender, weight, and height. Examples of healthy foods are listed below:  Eat a variety of vegetables  such as dark green, red, and orange vegetables. You can also include canned vegetables low in sodium (salt) and frozen vegetables without added butter or sauces. Eat a variety of fresh fruits , canned fruit in 100% juice, frozen fruit, and dried fruit. Include whole grains. At least half of the grains you eat should be whole grains. Examples include whole-wheat bread, wheat pasta, brown rice, and whole-grain cereals such as oatmeal.    Eat a variety of protein foods such as seafood (fish and shellfish), lean meat, and poultry without skin (turkey and chicken). Examples of lean meats include pork leg, shoulder, or tenderloin, and beef round, sirloin, tenderloin, and extra lean ground beef. Other protein foods include eggs and egg substitutes, beans, peas, soy products, nuts, and seeds. Choose low-fat dairy products such as skim or 1% milk or low-fat yogurt, cheese, and cottage cheese. Limit unhealthy fats  such as butter, hard margarine, and shortening. Exercise:  Exercise at least 30 minutes per day on most days of the week. Some examples of exercise include walking, biking, dancing, and swimming. You can also fit in more physical activity by taking the stairs instead of the elevator or parking farther away from stores. Include muscle strengthening activities 2 days each week. Regular exercise provides many health benefits.  It helps you manage your weight, and decreases your risk for type 2 diabetes, heart disease, stroke, and high blood pressure. Exercise can also help improve your mood. Ask your healthcare provider about the best exercise plan for you. General health and safety guidelines:   Do not smoke. Nicotine and other chemicals in cigarettes and cigars can cause lung damage. Ask your healthcare provider for information if you currently smoke and need help to quit. E-cigarettes or smokeless tobacco still contain nicotine. Talk to your healthcare provider before you use these products. Limit alcohol. A drink of alcohol is 12 ounces of beer, 5 ounces of wine, or 1½ ounces of liquor. Lose weight, if needed. Being overweight increases your risk of certain health conditions. These include heart disease, high blood pressure, type 2 diabetes, and certain types of cancer. Protect your skin. Do not sunbathe or use tanning beds. Use sunscreen with a SPF 15 or higher. Apply sunscreen at least 15 minutes before you go outside. Reapply sunscreen every 2 hours. Wear protective clothing, hats, and sunglasses when you are outside. Drive safely. Always wear your seatbelt. Make sure everyone in your car wears a seatbelt. A seatbelt can save your life if you are in an accident. Do not use your cell phone when you are driving. This could distract you and cause an accident. Pull over if you need to make a call or send a text message. Practice safe sex. Use latex condoms if are sexually active and have more than one partner. Your healthcare provider may recommend screening tests for sexually transmitted infections (STIs). Wear helmets, lifejackets, and protective gear. Always wear a helmet when you ride a bike or motorcycle, go skiing, or play sports that could cause a head injury. Wear protective equipment when you play sports. Wear a lifejacket when you are on a boat or doing water sports.     © Copyright [employer] 2023 Information is for End User's use only and may not be sold, redistributed or otherwise used for commercial purposes. The above information is an  only. It is not intended as medical advice for individual conditions or treatments. Talk to your doctor, nurse or pharmacist before following any medical regimen to see if it is safe and effective for you.

## 2024-02-21 PROBLEM — Z12.39 SCREENING FOR MALIGNANT NEOPLASM OF BREAST: Status: RESOLVED | Noted: 2019-02-27 | Resolved: 2024-02-21

## 2024-02-21 PROBLEM — Z13.6 ENCOUNTER FOR LIPID SCREENING FOR CARDIOVASCULAR DISEASE: Status: RESOLVED | Noted: 2020-11-19 | Resolved: 2024-02-21

## 2024-02-21 PROBLEM — Z13.220 ENCOUNTER FOR LIPID SCREENING FOR CARDIOVASCULAR DISEASE: Status: RESOLVED | Noted: 2020-11-19 | Resolved: 2024-02-21

## 2024-02-21 PROBLEM — Z11.51 SCREENING FOR HUMAN PAPILLOMAVIRUS (HPV): Status: RESOLVED | Noted: 2018-02-19 | Resolved: 2024-02-21

## 2024-03-06 DIAGNOSIS — L65.9 HAIR LOSS: ICD-10-CM

## 2024-03-06 RX ORDER — SPIRONOLACTONE 50 MG/1
50 TABLET, FILM COATED ORAL DAILY
Qty: 30 TABLET | Refills: 5 | Status: SHIPPED | OUTPATIENT
Start: 2024-03-06

## 2024-05-16 NOTE — PROGRESS NOTES
PT Evaluation     Today's date: 2024  Patient name: Myriam Desir  : 1965  MRN: [id number]  Referring provider: John Story PT  Dx:   Encounter Diagnosis     ICD-10-CM    1. Quadriceps strain, right, initial encounter  S76.111A       2. Craniofacial pain  R51.9           Start Time: 1245  Stop Time: 1330  Total time in clinic (min): 45 minutes    Assessment  Impairments: abnormal muscle firing, abnormal muscle tone, abnormal or restricted ROM, abnormal movement, activity intolerance, impaired physical strength and pain with function    Assessment details: Myriam Desir is a 58 y.o. female presenting to outpatient physical therapy on 24 with referral from [facility] for acute R quad strain and L sided craniofacial pain . Upon evaluation, Myriam demonstrates impaired quad tone/length and hypomobility in upper CS . The listed impairments and functional limitation are effecting Myriam ability to function at prior level. They can continue to benefit from physical therapy services at this times in order to address the above discussed impairments and functional limitation in order to allow for a return to premorbid status.    HEP : foam rolling, active quad stretch    Understanding of Dx/Px/POC: good     Prognosis: good    Plan  Patient would benefit from: skilled PT  Planned modality interventions: thermotherapy: hydrocollator packs    Planned therapy interventions: joint mobilization, manual therapy, ADL training, neuromuscular re-education, home exercise program, therapeutic exercise, therapeutic activities, strengthening, patient education and functional ROM exercises    Frequency: 2x week  Duration in weeks: 4  Plan of Care beginning date: 2024  Plan of Care expiration date: 2024  Treatment plan discussed with: patient      Subjective Evaluation    History of Present Illness  Mechanism of injury: Myriam is a 58 y.o. female presenting to physical therapy on 24 with referral from [facility] for R hip pain that began 3 weeks ago when doing a step up. She felt proximal anterior thigh pain when performing the step up. She took 4 days off and could feel pain with just walking. She resumed workouts around 4 day after injury and felt it with a lateral lunge with her R leg behind her. She then took took 10 days off and has been feeling better. She did workout with step ups and lunges but did not use weight this time and modified her depth. At this time she will feel pain with stretching it or loading it.     Denies numbness or tingle into LE.         She also c/o R lateral ankle pain, felt going down steps.     Patient also c/o of some R ankle tightness laterally, more so felt when going down steps, especially if she had worn heels    She has been having some L sided jaw pain, \"lemon: feeling. She denies head trauma, recent onset HA, UE/LE tingle.   Patient Goals  Patient goals for therapy: decreased pain, increased strength and return to sport/leisure activities    Pain  Current pain ratin  At worst pain ratin  Location: See above      Diagnostic Tests  No diagnostic tests performed  Treatments  Previous treatment: physical therapy    Short Term Goals:   1. Patient will be Independent with hep  2. Patient will improve pain with activity by 50%  3. Patient will report GROC 50% or greater  4. Patient will have pfree TTP along lateral quad      Long Term Goals:   1. Patient will improve FOTO to greater then goal  2. Patient will improve pain with activity to 2/10 or less  3. Patient will continue with HEP independence to allow for decreased future reoccurrence of pain and loss in function  4. Patient will report GROC 75% or greater  5. Patient will resume workouts pfree without limitation     Objective    Palpation: TTP proximal R quad, TTP    GAIT: normal  Squat assess: good form, pfree  + ely R  Pfree resist knee ext - 5/5    Hypoombile C2-3 L SG  Neg alar and transverse lig " testing          Hip:   Neg corry                          Precautions: standard       Manuals 5/17            STM R quad AB            C2-3 dist L G5                                      Neuro Re-Ed                                                                                                        Ther Ex                                                                                                                     Ther Activity                                       Gait Training                                       Modalities

## 2024-05-17 ENCOUNTER — OFFICE VISIT (OUTPATIENT)
Dept: PHYSICAL THERAPY | Facility: REHABILITATION | Age: 59
End: 2024-05-17
Payer: COMMERCIAL

## 2024-05-17 DIAGNOSIS — S76.111A QUADRICEPS STRAIN, RIGHT, INITIAL ENCOUNTER: Primary | ICD-10-CM

## 2024-05-17 DIAGNOSIS — R51.9 CRANIOFACIAL PAIN: ICD-10-CM

## 2024-05-17 PROCEDURE — 97140 MANUAL THERAPY 1/> REGIONS: CPT | Performed by: PHYSICAL THERAPIST

## 2024-05-17 PROCEDURE — 97162 PT EVAL MOD COMPLEX 30 MIN: CPT | Performed by: PHYSICAL THERAPIST

## 2024-09-13 ENCOUNTER — ANNUAL EXAM (OUTPATIENT)
Age: 59
End: 2024-09-13
Payer: COMMERCIAL

## 2024-09-13 VITALS
WEIGHT: 161.6 LBS | DIASTOLIC BLOOD PRESSURE: 84 MMHG | BODY MASS INDEX: 25.97 KG/M2 | SYSTOLIC BLOOD PRESSURE: 122 MMHG | HEIGHT: 66 IN

## 2024-09-13 DIAGNOSIS — Z01.419 ENCOUNTER FOR ANNUAL ROUTINE GYNECOLOGICAL EXAMINATION: Primary | ICD-10-CM

## 2024-09-13 DIAGNOSIS — Z12.31 SCREENING MAMMOGRAM FOR BREAST CANCER: ICD-10-CM

## 2024-09-13 PROCEDURE — S0612 ANNUAL GYNECOLOGICAL EXAMINA: HCPCS | Performed by: OBSTETRICS & GYNECOLOGY

## 2024-09-13 NOTE — PROGRESS NOTES
Myriam Desir   1965    CC:  Yearly exam    S:  59 y.o. female here for yearly exam. She is postmenopausal and has had no vaginal bleeding.    She denies vaginal discharge, itching, odor or dryness.    Saw Dr. Bushra Amaya.   Did a trial of testosterone - did not see huge differences.  Has really been working on better habits, plans to follow up with her potentially early next year.        Sexual activity: She is sexually active without pain, bleeding.      She does not report urinary incontinence, urinary concerns, bowel problems, breast concerns.     Last Pap: 4/25/22 - NILM, Neg HPV  Last Mammo: 11/1/23 - [facility] 1  Last Colonoscopy:  9/14/15 - 10yr recall     We reviewed ASCCP guidelines for Pap testing.     Family hx of breast cancer: no  Family hx of ovarian cancer: no  Family hx of colon cancer: no      Current Outpatient Medications:     Cholecalciferol (VITAMIN D3 PO), Take by mouth, Disp: , Rfl:     Fexofenadine HCl (ALLEGRA PO), Take 1 tablet by mouth daily as needed, Disp: , Rfl:     ibuprofen (MOTRIN) 600 mg tablet, Take by mouth every 6 (six) hours as needed for mild pain, Disp: , Rfl:     levothyroxine 75 mcg tablet, Take 1 tablet (75 mcg total) by mouth daily, Disp: 90 tablet, Rfl: 3    NON FORMULARY, Stress balance. Ashwaganda and Vitamin B, Disp: , Rfl:     omega-3-acid ethyl esters (LOVAZA) 1 g capsule, Take 2 g by mouth 2 (two) times a day, Disp: , Rfl:     other medication, see sig,, Medication/product name: Testosterone cream  Strength: 4mg/ml Sig (include dose, route, frequency): apply 0.5ml to labia daily, Disp: 15 mg, Rfl: 5    spironolactone (ALDACTONE) 50 mg tablet, take 1 tablet by mouth once daily, Disp: 30 tablet, Rfl: 5    Patient Active Problem List   Diagnosis    Encounter for screening mammogram for malignant neoplasm of breast    Thyroid nodule    Need for vaccination    Generalized anxiety disorder    Post-operative hypothyroidism    Annual physical exam     Family History  "  Problem Relation Age of Onset    Hypertension Mother     Lymphoma Mother 80    Crohn's disease Mother     Cancer Mother     No Known Problems Father     No Known Problems Sister     No Known Problems Daughter     Kidney cancer Maternal Grandmother 80    Cancer Maternal Grandfather 76        Unknown origin    No Known Problems Paternal Grandmother     No Known Problems Paternal Grandfather     Cancer Brother 31        Adrenal    No Known Problems Paternal Aunt     No Known Problems Paternal Aunt     Hypertension Family     Irritable bowel syndrome Family     Cancer Family         Intestinal Cancer    Varicose Veins Family      Past Medical History:   Diagnosis Date    Abnormal Pap smear of cervix In chart    Anxiety     Basal cell carcinoma 2018    Cancer (HCC)     Basal cell    Chest pain     last assessed - 09Apr2015    COVID-19 03/06/2023    Headache     Hypokalemia     last assessed - 09Apr2015    Migraine Teen years    Manageable    Miscarriage 2008    PONV (postoperative nausea and vomiting)     Shingles 07/2018      Review of Systems   Respiratory: Negative.    Cardiovascular: Negative.    Gastrointestinal: Negative for constipation and diarrhea.   Genitourinary: Negative for difficulty urinating, pelvic pain, vaginal bleeding, vaginal discharge, itching or odor.    O:  Blood pressure 122/84, height 5' 5.75\" (1.67 m), weight 73.3 kg (161 lb 9.6 oz), last menstrual period 10/01/2020.    Patient appears well and is not in distress  Breasts are symmetrical without mass, tenderness, nipple discharge, skin changes or adenopathy.   Abdomen is soft and nontender without masses.   External genitals are normal without lesions or rashes.  Urethral meatus and urethra are normal  Bladder is normal to palpation  Vagina is normal without discharge or bleeding, generalized atrophic changes present   Cervix is normal without discharge or lesion.   Uterus is normal, mobile, nontender without palpable mass.  Adnexa are " normal, nontender, without palpable mass.     A:  Yearly exam.     P:   Pap & HPV up to date  Mammo ordered   Colon Cancer Screening up to date   Follow up with Dr. Bushra Amaya as needed.       Reviewed considerations of menopause, to call with any postmenopausal bleeding or other concerns.      RTO one year for yearly exam or sooner as needed.

## 2024-10-23 ENCOUNTER — PATIENT MESSAGE (OUTPATIENT)
Dept: FAMILY MEDICINE CLINIC | Facility: CLINIC | Age: 59
End: 2024-10-23

## 2024-10-23 DIAGNOSIS — E03.9 HYPOTHYROIDISM, UNSPECIFIED TYPE: ICD-10-CM

## 2024-10-23 RX ORDER — LEVOTHYROXINE SODIUM 75 UG/1
TABLET ORAL
Qty: 30 TABLET | Refills: 0 | Status: SHIPPED | OUTPATIENT
Start: 2024-10-23 | End: 2024-10-24 | Stop reason: SDUPTHER

## 2024-10-24 DIAGNOSIS — E03.9 HYPOTHYROIDISM, UNSPECIFIED TYPE: ICD-10-CM

## 2024-10-24 RX ORDER — LEVOTHYROXINE SODIUM 75 UG/1
75 TABLET ORAL
Qty: 90 TABLET | Refills: 0 | Status: SHIPPED | OUTPATIENT
Start: 2024-10-24

## 2024-11-08 ENCOUNTER — OFFICE VISIT (OUTPATIENT)
Dept: URGENT CARE | Facility: CLINIC | Age: 59
End: 2024-11-08
Payer: COMMERCIAL

## 2024-11-08 VITALS
HEART RATE: 58 BPM | TEMPERATURE: 97.6 F | RESPIRATION RATE: 18 BRPM | OXYGEN SATURATION: 99 % | WEIGHT: 156 LBS | BODY MASS INDEX: 25.37 KG/M2 | DIASTOLIC BLOOD PRESSURE: 88 MMHG | SYSTOLIC BLOOD PRESSURE: 124 MMHG

## 2024-11-08 DIAGNOSIS — B96.89 BACTERIAL UPPER RESPIRATORY INFECTION: Primary | ICD-10-CM

## 2024-11-08 DIAGNOSIS — J06.9 BACTERIAL UPPER RESPIRATORY INFECTION: Primary | ICD-10-CM

## 2024-11-08 PROCEDURE — G0382 LEV 3 HOSP TYPE B ED VISIT: HCPCS | Performed by: NURSE PRACTITIONER

## 2024-11-08 PROCEDURE — S9083 URGENT CARE CENTER GLOBAL: HCPCS | Performed by: NURSE PRACTITIONER

## 2024-11-08 RX ORDER — AZITHROMYCIN 250 MG/1
TABLET, FILM COATED ORAL
Qty: 6 TABLET | Refills: 0 | Status: SHIPPED | OUTPATIENT
Start: 2024-11-08 | End: 2024-11-12

## 2024-11-08 RX ORDER — BENZONATATE 200 MG/1
200 CAPSULE ORAL 3 TIMES DAILY PRN
Qty: 20 CAPSULE | Refills: 0 | Status: SHIPPED | OUTPATIENT
Start: 2024-11-08

## 2024-11-08 NOTE — PROGRESS NOTES
[facility]        NAME: Myriam Desir is a 59 y.o. female  : 1965    MRN: [id number]  DATE: 2024  TIME: 1:40 PM    Assessment and Plan   Bacterial upper respiratory infection [J06.9, B96.89]  1. Bacterial upper respiratory infection  azithromycin (ZITHROMAX) 250 mg tablet    benzonatate (TESSALON) 200 MG capsule            Patient Instructions     Take medications as prescribed  Follow up with PCP in 3-5 days.  Proceed to  ER if symptoms worsen.    If tests have been performed at [facility], our office will contact you with results if changes need to be made to the care plan discussed with you at the visit.  You can review your full results on [facility].    Chief Complaint     Chief Complaint   Patient presents with    Cough     Started last Tuesday with cold like symptoms, now with worsening productive cough, worse at night, denies fever          History of Present Illness       HPI  Presents to clinic with complaint of cold symptoms for 1.5 weeks. Includes cough, congestion in the nose and head. Postnasal drip. Lightheadedness. Used ibuprofen without relief. Getting worse. No known sick contacts    Review of Systems   Review of Systems   Constitutional:  Negative for fever.   HENT:  Positive for congestion, postnasal drip and sinus pressure. Negative for ear pain and sore throat.    Respiratory:  Positive for cough. Negative for wheezing.    Cardiovascular:  Negative for chest pain.   Gastrointestinal:  Negative for diarrhea and vomiting.   Neurological:  Positive for headaches.         Current Medications       Current Outpatient Medications:     azithromycin (ZITHROMAX) 250 mg tablet, Take 2 tablets today then 1 tablet daily x 4 days, Disp: 6 tablet, Rfl: 0    benzonatate (TESSALON) 200 MG capsule, Take 1 capsule (200 mg total) by mouth 3 (three) times a day as needed for cough, Disp: 20 capsule, Rfl: 0    Cholecalciferol (VITAMIN D3 PO), Take by mouth, Disp: , Rfl:      Fexofenadine HCl (ALLEGRA PO), Take 1 tablet by mouth daily as needed, Disp: , Rfl:     ibuprofen (MOTRIN) 600 mg tablet, Take by mouth every 6 (six) hours as needed for mild pain, Disp: , Rfl:     levothyroxine 75 mcg tablet, Take 1 tablet (75 mcg total) by mouth daily in the early morning, Disp: 90 tablet, Rfl: 0    NON FORMULARY, Stress balance. Ashwaganda and Vitamin B, Disp: , Rfl:     omega-3-acid ethyl esters (LOVAZA) 1 g capsule, Take 2 g by mouth 2 (two) times a day, Disp: , Rfl:     other medication, see sig,, Medication/product name: Testosterone cream  Strength: 4mg/ml Sig (include dose, route, frequency): apply 0.5ml to labia daily, Disp: 15 mg, Rfl: 5    spironolactone (ALDACTONE) 50 mg tablet, take 1 tablet by mouth once daily, Disp: 30 tablet, Rfl: 5    Current Allergies     Allergies as of 11/08/2024 - Reviewed 11/08/2024   Allergen Reaction Noted    Amoxicillin Hives 04/22/2012    Codeine Nausea Only and Vomiting 04/22/2012    Molds & smuts  09/30/2015            The following portions of the patient's history were reviewed and updated as appropriate: allergies, current medications, past family history, past medical history, past social history, past surgical history and problem list.     Past Medical History:   Diagnosis Date    Abnormal Pap smear of cervix In chart    Anxiety     Basal cell carcinoma 2018    Cancer (HCC)     Basal cell    Chest pain     last assessed - 09Apr2015    COVID-19 03/06/2023    Headache     Hypokalemia     last assessed - 09Apr2015    Migraine Teen years    Manageable    Miscarriage 2008    PONV (postoperative nausea and vomiting)     Shingles 07/2018       Past Surgical History:   Procedure Laterality Date    CHOLECYSTECTOMY      COLONOSCOPY      COLPOSCOPY      CONDYLOMA EXCISION/FULGURATION  1987    GALLBLADDER SURGERY      GYNECOLOGIC CRYOSURGERY  1987    OTHER SURGICAL HISTORY      Contraceptives; last assessed - [id number]    MN TOTAL THYROID LOBECTOMY UNI W/WO  ISTHMUSECTOMY Left 11/27/2019    Procedure: THYROID LOBECTOMY; POSSIBLE TOTAL THYROIDECTOMY;  Surgeon: Candelario Swain MD;  Location: [facility];  Service: ENT    TOOTH EXTRACTION      US GUIDED THYROID BIOPSY  10/30/2019       Family History   Problem Relation Age of Onset    Hypertension Mother     Lymphoma Mother 80    Crohn's disease Mother     Cancer Mother     No Known Problems Father     No Known Problems Sister     No Known Problems Daughter     Kidney cancer Maternal Grandmother 80    Cancer Maternal Grandfather 76        Unknown origin    No Known Problems Paternal Grandmother     No Known Problems Paternal Grandfather     Cancer Brother 31        Adrenal    No Known Problems Paternal Aunt     No Known Problems Paternal Aunt     Hypertension Family     Irritable bowel syndrome Family     Cancer Family         Intestinal Cancer    Varicose Veins Family          Medications have been verified.        Objective   /88 (BP Location: Right arm, Patient Position: Sitting)   Pulse 58   Temp 97.6 °F (36.4 °C) (Tympanic)   Resp 18   Wt 70.8 kg (156 lb)   LMP 10/01/2020 (Exact Date)   SpO2 99%   BMI 25.37 kg/m²   Patient's last menstrual period was 10/01/2020 (exact date).       Physical Exam     Physical Exam  Constitutional:       General: She is not in acute distress.     Appearance: She is not ill-appearing.   HENT:      Head:      Comments: NTTP of the sinuses     Right Ear: Tympanic membrane normal.      Left Ear: Tympanic membrane normal.      Nose: Rhinorrhea present.      Comments: Turbinates 2+     Mouth/Throat:      Pharynx: No posterior oropharyngeal erythema.   Cardiovascular:      Rate and Rhythm: Regular rhythm.      Heart sounds: Normal heart sounds.   Pulmonary:      Effort: Pulmonary effort is normal.      Comments: Mild congestion in the lungs  Lymphadenopathy:      Cervical: No cervical adenopathy.

## 2024-11-14 ENCOUNTER — RESULTS FOLLOW-UP (OUTPATIENT)
Dept: FAMILY MEDICINE CLINIC | Facility: CLINIC | Age: 59
End: 2024-11-14

## 2024-11-14 ENCOUNTER — APPOINTMENT (OUTPATIENT)
Dept: LAB | Facility: CLINIC | Age: 59
End: 2024-11-14
Payer: COMMERCIAL

## 2024-11-14 DIAGNOSIS — E89.0 POST-OPERATIVE HYPOTHYROIDISM: ICD-10-CM

## 2024-11-14 LAB — TSH SERPL DL<=0.05 MIU/L-ACNC: 2.03 UIU/ML (ref 0.45–4.5)

## 2024-11-14 PROCEDURE — 36415 COLL VENOUS BLD VENIPUNCTURE: CPT

## 2024-11-14 PROCEDURE — 84443 ASSAY THYROID STIM HORMONE: CPT

## 2024-11-18 ENCOUNTER — RA CDI HCC (OUTPATIENT)
Dept: OTHER | Facility: HOSPITAL | Age: 59
End: 2024-11-18

## 2024-11-18 ENCOUNTER — OFFICE VISIT (OUTPATIENT)
Dept: FAMILY MEDICINE CLINIC | Facility: CLINIC | Age: 59
End: 2024-11-18
Payer: COMMERCIAL

## 2024-11-18 VITALS
DIASTOLIC BLOOD PRESSURE: 84 MMHG | HEIGHT: 66 IN | HEART RATE: 60 BPM | BODY MASS INDEX: 24.85 KG/M2 | OXYGEN SATURATION: 100 % | SYSTOLIC BLOOD PRESSURE: 120 MMHG | TEMPERATURE: 98.1 F | WEIGHT: 154.6 LBS | RESPIRATION RATE: 14 BRPM

## 2024-11-18 DIAGNOSIS — Z13.1 SCREENING FOR DIABETES MELLITUS: ICD-10-CM

## 2024-11-18 DIAGNOSIS — Z13.220 SCREENING, LIPID: ICD-10-CM

## 2024-11-18 DIAGNOSIS — E89.0 POST-OPERATIVE HYPOTHYROIDISM: Primary | ICD-10-CM

## 2024-11-18 DIAGNOSIS — Z23 ENCOUNTER FOR IMMUNIZATION: ICD-10-CM

## 2024-11-18 PROCEDURE — 99213 OFFICE O/P EST LOW 20 MIN: CPT | Performed by: FAMILY MEDICINE

## 2024-11-18 PROCEDURE — 90471 IMMUNIZATION ADMIN: CPT

## 2024-11-18 PROCEDURE — 90673 RIV3 VACCINE NO PRESERV IM: CPT

## 2024-11-18 NOTE — PROGRESS NOTES
"Name: Myriam Desir      : 1965      MRN: [id number]  Encounter Provider: Jillian Bullard DO  Encounter Date: 2024   Encounter department: [facility]    Assessment & Plan  Post-operative hypothyroidism  Tsh in good range  Orders:    CBC; Future    TSH, 3rd generation with Free T4 reflex; Future    Screening, lipid    Orders:    Lipid Panel with Direct LDL reflex; Future    Screening for diabetes mellitus    Orders:    Comprehensive metabolic panel; Future    Encounter for immunization    Orders:    influenza vaccine, recombinant, PF, 0.5 mL IM (Flublok)      Depression Screening and Follow-up Plan: Patient was screened for depression during today's encounter. They screened negative with a PHQ-2 score of 0.        History of Present Illness     History of Present Illness  Here for follow up  Recent URI  Was on zpak- no 100 percent yet  Persistent cough but better  Working hard on nutrition and exercise  Seeing noris in feb       Review of Systems   Constitutional: Negative.    HENT: Negative.     Eyes: Negative.    Respiratory:  Positive for cough.    Cardiovascular: Negative.    Gastrointestinal: Negative.    Endocrine: Negative.    Genitourinary: Negative.    Musculoskeletal: Negative.    Allergic/Immunologic: Negative.    Neurological: Negative.    Hematological: Negative.    Psychiatric/Behavioral: Negative.       Objective   /84 (BP Location: Left arm, Patient Position: Sitting, Cuff Size: Standard)   Pulse 60   Temp 98.1 °F (36.7 °C) (Tympanic)   Resp 14   Ht 5' 5.75\" (1.67 m)   Wt 70.1 kg (154 lb 9.6 oz)   LMP 10/01/2020 (Exact Date)   SpO2 100%   BMI 25.14 kg/m²     Physical Exam    Physical Exam  Vitals and nursing note reviewed.   Constitutional:       Appearance: Normal appearance. She is well-developed.   HENT:      Head: Normocephalic and atraumatic.      Right Ear: External ear normal.      Left Ear: External ear normal.      Nose: Nose normal. "   Eyes:      Extraocular Movements: Extraocular movements intact.      Conjunctiva/sclera: Conjunctivae normal.      Pupils: Pupils are equal, round, and reactive to light.   Cardiovascular:      Rate and Rhythm: Normal rate and regular rhythm.      Heart sounds: Normal heart sounds.   Pulmonary:      Effort: Pulmonary effort is normal.      Breath sounds: Normal breath sounds.   Abdominal:      General: Bowel sounds are normal.      Palpations: Abdomen is soft.   Musculoskeletal:         General: Normal range of motion.      Cervical back: Normal range of motion and neck supple.   Skin:     General: Skin is warm and dry.      Capillary Refill: Capillary refill takes less than 2 seconds.   Neurological:      Mental Status: She is alert and oriented to person, place, and time.   Psychiatric:         Mood and Affect: Mood normal.         Behavior: Behavior normal.         Thought Content: Thought content normal.         Judgment: Judgment normal.

## 2024-11-19 ENCOUNTER — TELEPHONE (OUTPATIENT)
Age: 59
End: 2024-11-19

## 2025-01-08 ENCOUNTER — LAB REQUISITION (OUTPATIENT)
Dept: LAB | Facility: HOSPITAL | Age: 60
End: 2025-01-08
Payer: COMMERCIAL

## 2025-01-08 DIAGNOSIS — A31.9 MYCOBACTERIAL INFECTION, UNSPECIFIED: ICD-10-CM

## 2025-01-08 DIAGNOSIS — A49.01 METHICILLIN SUSCEPTIBLE STAPHYLOCOCCUS AUREUS INFECTION, UNSPECIFIED SITE: ICD-10-CM

## 2025-01-08 DIAGNOSIS — B95.8 UNSPECIFIED STAPHYLOCOCCUS AS THE CAUSE OF DISEASES CLASSIFIED ELSEWHERE: ICD-10-CM

## 2025-01-08 PROCEDURE — 87206 SMEAR FLUORESCENT/ACID STAI: CPT | Performed by: DERMATOLOGY

## 2025-01-08 PROCEDURE — 87205 SMEAR GRAM STAIN: CPT | Performed by: DERMATOLOGY

## 2025-01-08 PROCEDURE — 87070 CULTURE OTHR SPECIMN AEROBIC: CPT | Performed by: DERMATOLOGY

## 2025-01-08 PROCEDURE — 87116 MYCOBACTERIA CULTURE: CPT | Performed by: DERMATOLOGY

## 2025-01-09 LAB — RHODAMINE-AURAMINE STN SPEC: NORMAL

## 2025-01-11 LAB
BACTERIA WND AEROBE CULT: ABNORMAL
GRAM STN SPEC: ABNORMAL

## 2025-01-14 LAB
MYCOBACTERIUM SPEC CULT: NORMAL
RHODAMINE-AURAMINE STN SPEC: NORMAL

## 2025-01-21 LAB
MYCOBACTERIUM SPEC CULT: NORMAL
RHODAMINE-AURAMINE STN SPEC: NORMAL

## 2025-01-22 DIAGNOSIS — E03.9 HYPOTHYROIDISM, UNSPECIFIED TYPE: ICD-10-CM

## 2025-01-22 RX ORDER — LEVOTHYROXINE SODIUM 75 UG/1
75 TABLET ORAL
Qty: 90 TABLET | Refills: 1 | Status: SHIPPED | OUTPATIENT
Start: 2025-01-22

## 2025-01-28 LAB
MYCOBACTERIUM SPEC CULT: NORMAL
RHODAMINE-AURAMINE STN SPEC: NORMAL

## 2025-02-04 LAB
MYCOBACTERIUM SPEC CULT: NORMAL
RHODAMINE-AURAMINE STN SPEC: NORMAL

## 2025-02-11 LAB
MYCOBACTERIUM SPEC CULT: NORMAL
RHODAMINE-AURAMINE STN SPEC: NORMAL

## 2025-02-18 LAB
MYCOBACTERIUM SPEC CULT: NORMAL
RHODAMINE-AURAMINE STN SPEC: NORMAL

## 2025-02-19 ENCOUNTER — OFFICE VISIT (OUTPATIENT)
Age: 60
End: 2025-02-19
Payer: COMMERCIAL

## 2025-02-19 DIAGNOSIS — R68.82 DECREASED LIBIDO: Primary | ICD-10-CM

## 2025-02-19 PROCEDURE — 99215 OFFICE O/P EST HI 40 MIN: CPT | Performed by: OBSTETRICS & GYNECOLOGY

## 2025-02-20 PROBLEM — Z12.31 ENCOUNTER FOR SCREENING MAMMOGRAM FOR MALIGNANT NEOPLASM OF BREAST: Status: RESOLVED | Noted: 2018-02-19 | Resolved: 2025-02-20

## 2025-02-20 PROBLEM — Z23 NEED FOR VACCINATION: Status: RESOLVED | Noted: 2019-09-23 | Resolved: 2025-02-20

## 2025-02-23 PROBLEM — Z00.00 ANNUAL PHYSICAL EXAM: Status: RESOLVED | Noted: 2020-11-19 | Resolved: 2025-02-23

## 2025-02-23 NOTE — PROGRESS NOTES
":  Assessment & Plan  Decreased libido    Orders:    other medication, see sig,; Medication/product name: testosterone cream  Strength: 10 mg/ml  Sig (include dose, route, frequency): apply 0.5 ml to labia daily      1) She has really done great work in adrenal healing.   2) With regards to libido, testosterone supplementation is the strongest option I have, reminding her that I started her on a lower dose, and there is much room for improvement with increase. Other supplements offered, not superior.  3)  I reminded her that it often takes a \" pharmacologic level\" which is much higher than a \" physiologic level\" for results, and that libido often takes 2-3 months to improve.   4) She would like to try it again, this time starting with 5 mg/ d. Email with progress reports monthly, can titrate dose as needed. IF still not improved in 3 months, need repeat labs again and consideration of other options. May hold spironolactone if desired.   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 Testosterone would be a great adjunct to help compensate for lack of HRT, helpful for bone and muscle mass, cardiac function.   6) Email progress report, follow up prn.    This was a 45 minute visit with greater than 50% of time spent in face to face counseling and coordination of care    Myriam returns for hormone consult follow up. I saw her last in 9/23 with decreased libido, fatigue, weight gain. Adrenal labs with suboptimal testosterone, elevated cortisol levels in afternoon per 24 hr salivary testing.   I offered testosterone cream 2 mg/d/ , spironolactone for hair support, Stress Balance for cortisol reduction. Since then:  1) Admits that testosterone did nothing after a couple months so stopped it. Libido is still very poor.   2) Has lost 20 lbs over past 1.5 years due to intentional exercise and nutrition. Counting macros, intermittent fasting, carb cycling has worked well.   3) Still taking stress balance. Feels her cortisol " levels must be better.   No other change in health status or family history updates.     Review of Systems   Constitutional: Negative.    Gastrointestinal: Negative.    Endocrine: Negative.    Genitourinary:         Decreased libido   Musculoskeletal: Negative.    Neurological: Negative.    Psychiatric/Behavioral: Negative.          Physical Exam

## 2025-02-25 LAB
MYCOBACTERIUM SPEC CULT: NORMAL
RHODAMINE-AURAMINE STN SPEC: NORMAL

## 2025-04-01 ENCOUNTER — HOSPITAL ENCOUNTER (OUTPATIENT)
Dept: MAMMOGRAPHY | Facility: CLINIC | Age: 60
Discharge: HOME | End: 2025-04-01
Payer: COMMERCIAL

## 2025-04-01 VITALS — HEIGHT: 66 IN | WEIGHT: 154 LBS | BODY MASS INDEX: 24.75 KG/M2

## 2025-04-01 DIAGNOSIS — Z12.31 SCREENING MAMMOGRAM FOR BREAST CANCER: ICD-10-CM

## 2025-04-01 PROCEDURE — 77067 SCR MAMMO BI INCL CAD: CPT

## 2025-04-01 PROCEDURE — 77063 BREAST TOMOSYNTHESIS BI: CPT

## 2025-04-21 ENCOUNTER — APPOINTMENT (OUTPATIENT)
Dept: LAB | Facility: CLINIC | Age: 60
End: 2025-04-21
Payer: COMMERCIAL

## 2025-04-21 DIAGNOSIS — Z13.220 SCREENING, LIPID: ICD-10-CM

## 2025-04-21 DIAGNOSIS — E89.0 POST-OPERATIVE HYPOTHYROIDISM: ICD-10-CM

## 2025-04-21 DIAGNOSIS — Z13.1 SCREENING FOR DIABETES MELLITUS: ICD-10-CM

## 2025-04-21 LAB
ALBUMIN SERPL BCG-MCNC: 4.2 G/DL (ref 3.5–5)
ALP SERPL-CCNC: 41 U/L (ref 34–104)
ALT SERPL W P-5'-P-CCNC: 26 U/L (ref 7–52)
ANION GAP SERPL CALCULATED.3IONS-SCNC: 7 MMOL/L (ref 4–13)
AST SERPL W P-5'-P-CCNC: 21 U/L (ref 13–39)
BILIRUB SERPL-MCNC: 0.64 MG/DL (ref 0.2–1)
BUN SERPL-MCNC: 15 MG/DL (ref 5–25)
CALCIUM SERPL-MCNC: 9.6 MG/DL (ref 8.4–10.2)
CHLORIDE SERPL-SCNC: 105 MMOL/L (ref 96–108)
CHOLEST SERPL-MCNC: 221 MG/DL (ref ?–200)
CO2 SERPL-SCNC: 30 MMOL/L (ref 21–32)
CREAT SERPL-MCNC: 0.69 MG/DL (ref 0.6–1.3)
ERYTHROCYTE [DISTWIDTH] IN BLOOD BY AUTOMATED COUNT: 13.1 % (ref 11.6–15.1)
GFR SERPL CREATININE-BSD FRML MDRD: 95 ML/MIN/1.73SQ M
GLUCOSE P FAST SERPL-MCNC: 84 MG/DL (ref 65–99)
HCT VFR BLD AUTO: 42.8 % (ref 34.8–46.1)
HDLC SERPL-MCNC: 64 MG/DL
HGB BLD-MCNC: 13.9 G/DL (ref 11.5–15.4)
LDLC SERPL CALC-MCNC: 135 MG/DL (ref 0–100)
MCH RBC QN AUTO: 29.3 PG (ref 26.8–34.3)
MCHC RBC AUTO-ENTMCNC: 32.5 G/DL (ref 31.4–37.4)
MCV RBC AUTO: 90 FL (ref 82–98)
PLATELET # BLD AUTO: 314 THOUSANDS/UL (ref 149–390)
PMV BLD AUTO: 9.6 FL (ref 8.9–12.7)
POTASSIUM SERPL-SCNC: 4.5 MMOL/L (ref 3.5–5.3)
PROT SERPL-MCNC: 6.7 G/DL (ref 6.4–8.4)
RBC # BLD AUTO: 4.75 MILLION/UL (ref 3.81–5.12)
SODIUM SERPL-SCNC: 142 MMOL/L (ref 135–147)
TRIGL SERPL-MCNC: 111 MG/DL (ref ?–150)
TSH SERPL DL<=0.05 MIU/L-ACNC: 2.45 UIU/ML (ref 0.45–4.5)
WBC # BLD AUTO: 5.28 THOUSAND/UL (ref 4.31–10.16)

## 2025-04-21 PROCEDURE — 80061 LIPID PANEL: CPT

## 2025-04-21 PROCEDURE — 84443 ASSAY THYROID STIM HORMONE: CPT

## 2025-04-21 PROCEDURE — 36415 COLL VENOUS BLD VENIPUNCTURE: CPT

## 2025-04-21 PROCEDURE — 85027 COMPLETE CBC AUTOMATED: CPT

## 2025-04-21 PROCEDURE — 80053 COMPREHEN METABOLIC PANEL: CPT

## 2025-04-22 ENCOUNTER — RESULTS FOLLOW-UP (OUTPATIENT)
Dept: FAMILY MEDICINE CLINIC | Facility: CLINIC | Age: 60
End: 2025-04-22

## 2025-04-28 ENCOUNTER — OFFICE VISIT (OUTPATIENT)
Dept: FAMILY MEDICINE CLINIC | Facility: CLINIC | Age: 60
End: 2025-04-28
Payer: COMMERCIAL

## 2025-04-28 VITALS
SYSTOLIC BLOOD PRESSURE: 124 MMHG | OXYGEN SATURATION: 97 % | DIASTOLIC BLOOD PRESSURE: 80 MMHG | BODY MASS INDEX: 27.59 KG/M2 | HEIGHT: 65 IN | TEMPERATURE: 96.5 F | RESPIRATION RATE: 16 BRPM | WEIGHT: 165.6 LBS | HEART RATE: 64 BPM

## 2025-04-28 DIAGNOSIS — Z00.00 ANNUAL PHYSICAL EXAM: Primary | ICD-10-CM

## 2025-04-28 DIAGNOSIS — Z86.19 HISTORY OF HERPES ZOSTER: ICD-10-CM

## 2025-04-28 DIAGNOSIS — E89.0 POST-OPERATIVE HYPOTHYROIDISM: ICD-10-CM

## 2025-04-28 PROCEDURE — 99396 PREV VISIT EST AGE 40-64: CPT | Performed by: FAMILY MEDICINE

## 2025-04-28 NOTE — PROGRESS NOTES
Adult Annual Physical  Name: Myriam Desir      : 1965      MRN: [id number]  Encounter Provider: Jillian Bullard DO  Encounter Date: 2025   Encounter department: [facility]    :  Assessment & Plan  Annual physical exam  Discussed shingrix       Post-operative hypothyroidism    Orders:  •  TSH, 3rd generation with Free T4 reflex; Future    History of herpes zoster             Preventive Screenings:  - Diabetes Screening: screening up-to-date  - Hepatitis C screening: screening up-to-date   - HIV screening: screening not indicated   - Breast cancer screening: screening up-to-date   - Colon cancer screening: screening up-to-date   - Lung cancer screening: screening not indicated     Immunizations:  - Immunizations due: Zoster (Shingrix)    Counseling/Anticipatory Guidance:    - Dental health: discussed importance of regular tooth brushing, flossing, and dental visits.       Depression Screening and Follow-up Plan: Patient was screened for depression during today's encounter. They screened negative with a PHQ-2 score of 1.          History of Present Illness     Adult Annual Physical:  Patient presents for annual physical. Here for wellness  Was sick early Nov  Reset in   Polar plunge and then spot on leg right   Spots over both legs  Went to derm  Took biopsies and cultures  Gave oral antibiotics and topical  Was contact dermatitis  Sick at end of Feb ? Covid, sinus infection  .     Diet and Physical Activity:  - Diet/Nutrition: no special diet, well balanced diet and intermittent fasting. Need to cut back sugar  - Exercise: strength training exercises and 1-2 times a week on average.    Depression Screening:  - PHQ-2 Score: 1    General Health:  - Sleep: sleeps well and 7-8 hours of sleep on average.  - Hearing: tinnitus.  - Vision: most recent eye exam < 1 year ago and wears glasses.  - Dental: regular dental visits, brushes teeth twice daily and floss regularly.    /GYN  "Health:  - Follows with GYN: yes.   - Menopause: postmenopausal.   - History of STDs: yes  - Contraception: menopause.      Advanced Care Planning:  - Has an advanced directive?: yes    - Has a durable medical POA?: yes      Review of Systems   Constitutional: Negative.    HENT: Negative.     Eyes: Negative.    Respiratory:  Positive for cough (constant dry).    Cardiovascular: Negative.    Gastrointestinal: Negative.    Endocrine: Negative.    Genitourinary: Negative.    Musculoskeletal: Negative.    Skin: Negative.    Allergic/Immunologic: Negative.    Neurological: Negative.    Hematological: Negative.    Psychiatric/Behavioral: Negative.       Medical History Reviewed by provider this encounter:  Tobacco  Allergies  Meds  Problems  Med Hx  Surg Hx  Fam Hx     .    Objective   /80 (BP Location: Left arm, Patient Position: Sitting, Cuff Size: Large)   Pulse 64   Temp (!) 96.5 °F (35.8 °C) (Tympanic)   Resp 16   Ht 5' 5.47\" (1.663 m)   Wt 75.1 kg (165 lb 9.6 oz)   LMP 10/01/2020 (Exact Date)   SpO2 97%   BMI 27.16 kg/m²     Physical Exam  Vitals and nursing note reviewed.   Constitutional:       Appearance: Normal appearance. She is well-developed.   HENT:      Head: Normocephalic and atraumatic.      Right Ear: External ear normal.      Left Ear: External ear normal.      Nose: Nose normal.   Eyes:      Extraocular Movements: Extraocular movements intact.      Conjunctiva/sclera: Conjunctivae normal.      Pupils: Pupils are equal, round, and reactive to light.   Cardiovascular:      Rate and Rhythm: Normal rate and regular rhythm.      Heart sounds: Normal heart sounds.   Pulmonary:      Effort: Pulmonary effort is normal.      Breath sounds: Normal breath sounds.   Abdominal:      General: Abdomen is flat. Bowel sounds are normal.      Palpations: Abdomen is soft.   Musculoskeletal:         General: Normal range of motion.      Cervical back: Normal range of motion and neck supple.   Skin:    "  General: Skin is warm and dry.      Capillary Refill: Capillary refill takes less than 2 seconds.   Neurological:      General: No focal deficit present.      Mental Status: She is alert and oriented to person, place, and time.   Psychiatric:         Mood and Affect: Mood normal.         Behavior: Behavior normal.         Thought Content: Thought content normal.         Judgment: Judgment normal.

## 2025-04-28 NOTE — PATIENT INSTRUCTIONS
"Patient Education     Routine physical for adults   The Basics   Written by the doctors and editors at [facility]   What is a physical? -- A physical is a routine visit, or \"check-up,\" with your doctor. You might also hear it called a \"wellness visit\" or \"preventive visit.\"  During each visit, the doctor will:   Ask about your physical and mental health   Ask about your habits, behaviors, and lifestyle   Do an exam   Give you vaccines if needed   Talk to you about any medicines you take   Give advice about your health   Answer your questions  Getting regular check-ups is an important part of taking care of your health. It can help your doctor find and treat any problems you have. But it's also important for preventing health problems.  A routine physical is different from a \"sick visit.\" A sick visit is when you see a doctor because of a health concern or problem. Since physicals are scheduled ahead of time, you can think about what you want to ask the doctor.  How often should I get a physical? -- It depends on your age and health. In general, for people age 21 years and older:   If you are younger than 50 years, you might be able to get a physical every 3 years.   If you are 50 years or older, your doctor might recommend a physical every year.  If you have an ongoing health condition, like diabetes or high blood pressure, your doctor will probably want to see you more often.  What happens during a physical? -- In general, each visit will include:   Physical exam - The doctor or nurse will check your height, weight, heart rate, and blood pressure. They will also look at your eyes and ears. They will ask about how you are feeling and whether you have any symptoms that bother you.   Medicines - It's a good idea to bring a list of all the medicines you take to each doctor visit. Your doctor will talk to you about your medicines and answer any questions. Tell them if you are having any side effects that bother you. You " "should also tell them if you are having trouble paying for any of your medicines.   Habits and behaviors - This includes:   Your diet   Your exercise habits   Whether you smoke, drink alcohol, or use drugs   Whether you are sexually active   Whether you feel safe at home  Your doctor will talk to you about things you can do to improve your health and lower your risk of health problems. They will also offer help and support. For example, if you want to quit smoking, they can give you advice and might prescribe medicines. If you want to improve your diet or get more physical activity, they can help you with this, too.   Lab tests, if needed - The tests you get will depend on your age and situation. For example, your doctor might want to check your:   Cholesterol   Blood sugar   Iron level   Vaccines - The recommended vaccines will depend on your age, health, and what vaccines you already had. Vaccines are very important because they can prevent certain serious or deadly infections.   Discussion of screening - \"Screening\" means checking for diseases or other health problems before they cause symptoms. Your doctor can recommend screening based on your age, risk, and preferences. This might include tests to check for:   Cancer, such as breast, prostate, cervical, ovarian, colorectal, prostate, lung, or skin cancer   Sexually transmitted infections, such as chlamydia and gonorrhea   Mental health conditions like depression and anxiety  Your doctor will talk to you about the different types of screening tests. They can help you decide which screenings to have. They can also explain what the results might mean.   Answering questions - The physical is a good time to ask the doctor or nurse questions about your health. If needed, they can refer you to other doctors or specialists, too.  Adults older than 65 years often need other care, too. As you get older, your doctor will talk to you about:   How to prevent falling at " home   Hearing or vision tests   Memory testing   How to take your medicines safely   Making sure that you have the help and support you need at home  All topics are updated as new evidence becomes available and our peer review process is complete.  This topic retrieved from [employer] on: May 02, 2024.  Topic 786443 Version 1.0  Release: 32.4.3 - C32.122  © 2024 [employer] and/or its affiliates. All rights reserved.  Consumer Information Use and Disclaimer   Disclaimer: This generalized information is a limited summary of diagnosis, treatment, and/or medication information. It is not meant to be comprehensive and should be used as a tool to help the user understand and/or assess potential diagnostic and treatment options. It does NOT include all information about conditions, treatments, medications, side effects, or risks that may apply to a specific patient. It is not intended to be medical advice or a substitute for the medical advice, diagnosis, or treatment of a health care provider based on the health care provider's examination and assessment of a patient's specific and unique circumstances. Patients must speak with a health care provider for complete information about their health, medical questions, and treatment options, including any risks or benefits regarding use of medications. This information does not endorse any treatments or medications as safe, effective, or approved for treating a specific patient. [employer] and its affiliates disclaim any warranty or liability relating to this information or the use thereof.The use of this information is governed by the Terms of Use, available at https://www.[employer].com/en/know/clinical-effectiveness-terms. 2024© [employer] and its affiliates and/or licensors. All rights reserved.  Copyright   © 2024 [employer] and/or its affiliates. All rights reserved.

## 2025-07-21 DIAGNOSIS — E03.9 HYPOTHYROIDISM, UNSPECIFIED TYPE: ICD-10-CM

## 2025-07-21 RX ORDER — LEVOTHYROXINE SODIUM 75 UG/1
75 TABLET ORAL
Qty: 90 TABLET | Refills: 1 | Status: SHIPPED | OUTPATIENT
Start: 2025-07-21

## (undated) DEVICE — LIGHT HANDLE COVER SLEEVE DISP BLUE STELLAR

## (undated) DEVICE — BIPOLAR CORD DISP

## (undated) DEVICE — SPONGE CHERRY 1/2IN

## (undated) DEVICE — ARISTA AH ABSORBABLE HEMOSTATIC PARTICLES: Brand: ARISTA™ AH

## (undated) DEVICE — GLOVE SRG BIOGEL ORTHOPEDIC 7

## (undated) DEVICE — SUT SILK 3-0 18 IN A184H

## (undated) DEVICE — SUT SILK 3-0 SH 30 IN K832H

## (undated) DEVICE — NEEDLE 25G X 1 1/2

## (undated) DEVICE — SYRINGE 10ML LL

## (undated) DEVICE — 3M™ STERI-STRIP™ REINFORCED ADHESIVE SKIN CLOSURES, R1547, 1/2 IN X 4 IN (12 MM X 100 MM), 6 STRIPS/ENVELOPE: Brand: 3M™ STERI-STRIP™

## (undated) DEVICE — TIBURON SPLIT SHEET: Brand: CONVERTORS

## (undated) DEVICE — CHLORAPREP HI-LITE 26ML ORANGE

## (undated) DEVICE — INTENDED FOR TISSUE SEPARATION, AND OTHER PROCEDURES THAT REQUIRE A SHARP SURGICAL BLADE TO PUNCTURE OR CUT.: Brand: BARD-PARKER SAFETY BLADES SIZE 15, STERILE

## (undated) DEVICE — VIAL DECANTER

## (undated) DEVICE — SUT VICRYL 3-0 SH 27 IN J416H

## (undated) DEVICE — PACK UNIVERSAL NECK

## (undated) DEVICE — ELECTRODE BLADE MOD E-Z CLEAN  2.75IN 7CM -0012AM

## (undated) DEVICE — SUT MONOCRYL 4-0 PS-2 18 IN Y496G